# Patient Record
Sex: FEMALE | Race: WHITE | NOT HISPANIC OR LATINO
[De-identification: names, ages, dates, MRNs, and addresses within clinical notes are randomized per-mention and may not be internally consistent; named-entity substitution may affect disease eponyms.]

---

## 2020-10-06 ENCOUNTER — TRANSCRIPTION ENCOUNTER (OUTPATIENT)
Age: 65
End: 2020-10-06

## 2020-11-10 ENCOUNTER — APPOINTMENT (OUTPATIENT)
Dept: ORTHOPEDIC SURGERY | Facility: CLINIC | Age: 65
End: 2020-11-10
Payer: MEDICARE

## 2020-11-10 VITALS
HEIGHT: 63 IN | HEART RATE: 63 BPM | SYSTOLIC BLOOD PRESSURE: 145 MMHG | DIASTOLIC BLOOD PRESSURE: 77 MMHG | BODY MASS INDEX: 28.7 KG/M2 | WEIGHT: 162 LBS

## 2020-11-10 VITALS — TEMPERATURE: 97.8 F

## 2020-11-10 PROCEDURE — 72100 X-RAY EXAM L-S SPINE 2/3 VWS: CPT

## 2020-11-10 PROCEDURE — 73502 X-RAY EXAM HIP UNI 2-3 VIEWS: CPT | Mod: LT

## 2020-11-10 PROCEDURE — 20610 DRAIN/INJ JOINT/BURSA W/O US: CPT | Mod: LT

## 2020-11-10 PROCEDURE — 99204 OFFICE O/P NEW MOD 45 MIN: CPT | Mod: 25

## 2020-11-10 NOTE — PHYSICAL EXAM
[de-identified] : Patient is well nourished, well-developed, in no acute distress, with appropriate mood and affect. The patient is oriented to time, place, and person. Respirations are even and unlabored. Gait evaluation does not reveal a limp. There is no inguinal adenopathy. Examination of the contralateral hip shows normal range of motion, strength, no tenderness, and intact skin. The affected limb is well-perfused and showed 2+ dp/pt pulses, without skin lesions, shows a grossly normal motor and sensory examination. Examination of the hip shows no skin lesions. Hip motion is full and painless from 0-90 degrees extension to flexion, 20 degrees adduction and 20 degrees abduction, and 15 degrees internal and 30 degrees external rotation. Leg lengths are approximately equal. FADIR is negative and MICHAEL is negative. Stinchfield test is negative. Both hips are stable and muscle strength is normal with good strength with resisted abduction and adduction. Pedal pulses are palpable.  Examination of the lumbar spine reveals full range of motion without pain. There is no tenderness to palpation of the osseous structures or paravertebral soft tissues. There is no muscle spasm. Straight leg raise is negative bilaterally. [de-identified] : AP and lateral x-rays of the left hip, pelvis, and femur were ordered and taken in the office and demonstrate no evidence of degenerative joint disease of the hip with maintained joint space and no evidence of fractures or other intraarticular pathology.\par \par AP lateral radius of the lumbar spine were ordered and obtained the office demonstrate severe degenerative disc disease of the lumbar spine as well as a spondylolisthesis L4-5.  No evidence of spondylolysis.

## 2020-11-10 NOTE — HISTORY OF PRESENT ILLNESS
[de-identified] : This is very nice 65-year-old female experiencing left buttock and lateral hip pain, which is severe in intensity.  The pain substantially limits activities of daily living. Walking tolerance is reduced.  No groin pain.  She does not take any medications for this.  She has not tried physical therapy.  She does not use a cane or walker.  She states that she has had chronic low back pain for several years.  She has had pain in the left hip since she fell 9 months ago.  She also has chronic pain in the right foot from a prior calcaneus fracture surgery and so she walks with a limp for the last several years.  The patient denies any radiation of the pain to the feet and it is not associated with numbness, tingling, or weakness.

## 2020-11-10 NOTE — DISCUSSION/SUMMARY
[de-identified] : This patient has severe degenerative disc disease of the lumbar spine as well as a spondylolisthesis L4-5.  She also has left hip trochanteric bursitis.  The patient is not an appropriate candidate for surgical intervention at this time. An extensive discussion was conducted on the natural history of the disease and the variety of surgical and non-surgical options available to the patient including, but not limited to non-steroidal anti-inflammatory medications, steroid injections, physical therapy, maintenance of ideal body weight, and reduction of activity.  Today we performed an injection of the left hip trochanteric bursa which relieved a significant portion of the left lateral hip pain.  Referred to physical therapy for core body strengthening and gait training.  Referred to physiatry for epidural steroid injections or selective nerve root injections given her severe degenerative disease of the lumbar spine.  The patient will schedule an appointment as needed.\par \par Informed consent for the left hip trochanteric bursa injection was obtained. All questions were answered. A time out was performed. The left lateral hip was prepped and draped in sterile fashion. Using sterile technique, the left greater trochanter was injection with 2cc of Kenalog and 4cc of 0.25% marcaine using a 21-gauge needle. A sterile dressing was applied. Post injection instructions were reviewed. The patient reported relief of symptoms after the injection. The patient tolerated the procedure well.\par

## 2020-11-23 ENCOUNTER — TRANSCRIPTION ENCOUNTER (OUTPATIENT)
Age: 65
End: 2020-11-23

## 2020-11-25 ENCOUNTER — TRANSCRIPTION ENCOUNTER (OUTPATIENT)
Age: 65
End: 2020-11-25

## 2020-12-07 ENCOUNTER — TRANSCRIPTION ENCOUNTER (OUTPATIENT)
Age: 65
End: 2020-12-07

## 2020-12-30 ENCOUNTER — TRANSCRIPTION ENCOUNTER (OUTPATIENT)
Age: 65
End: 2020-12-30

## 2021-01-23 ENCOUNTER — TRANSCRIPTION ENCOUNTER (OUTPATIENT)
Age: 66
End: 2021-01-23

## 2021-01-28 ENCOUNTER — APPOINTMENT (OUTPATIENT)
Dept: FAMILY MEDICINE | Facility: CLINIC | Age: 66
End: 2021-01-28
Payer: MEDICARE

## 2021-01-28 VITALS
HEIGHT: 63 IN | BODY MASS INDEX: 30.65 KG/M2 | TEMPERATURE: 97.6 F | SYSTOLIC BLOOD PRESSURE: 140 MMHG | DIASTOLIC BLOOD PRESSURE: 82 MMHG | HEART RATE: 70 BPM | OXYGEN SATURATION: 98 % | WEIGHT: 173 LBS

## 2021-01-28 VITALS — DIASTOLIC BLOOD PRESSURE: 80 MMHG | SYSTOLIC BLOOD PRESSURE: 130 MMHG

## 2021-01-28 DIAGNOSIS — Z13.89 ENCOUNTER FOR SCREENING FOR OTHER DISORDER: ICD-10-CM

## 2021-01-28 DIAGNOSIS — Z13.820 ENCOUNTER FOR SCREENING FOR OSTEOPOROSIS: ICD-10-CM

## 2021-01-28 DIAGNOSIS — E55.9 VITAMIN D DEFICIENCY, UNSPECIFIED: ICD-10-CM

## 2021-01-28 DIAGNOSIS — Z13.1 ENCOUNTER FOR SCREENING FOR DIABETES MELLITUS: ICD-10-CM

## 2021-01-28 DIAGNOSIS — Z13.29 ENCOUNTER FOR SCREENING FOR OTHER SUSPECTED ENDOCRINE DISORDER: ICD-10-CM

## 2021-01-28 DIAGNOSIS — Z13.220 ENCOUNTER FOR SCREENING FOR LIPOID DISORDERS: ICD-10-CM

## 2021-01-28 DIAGNOSIS — R10.9 UNSPECIFIED ABDOMINAL PAIN: ICD-10-CM

## 2021-01-28 DIAGNOSIS — R22.2 LOCALIZED SWELLING, MASS AND LUMP, TRUNK: ICD-10-CM

## 2021-01-28 PROCEDURE — G0439: CPT

## 2021-01-28 PROCEDURE — G0444 DEPRESSION SCREEN ANNUAL: CPT | Mod: 59

## 2021-01-28 RX ORDER — NADOLOL 20 MG/1
20 TABLET ORAL
Refills: 0 | Status: ACTIVE | COMMUNITY

## 2021-02-01 ENCOUNTER — TRANSCRIPTION ENCOUNTER (OUTPATIENT)
Age: 66
End: 2021-02-01

## 2021-02-01 LAB
25(OH)D3 SERPL-MCNC: 26.4 NG/ML
ALBUMIN SERPL ELPH-MCNC: 4.3 G/DL
ALP BLD-CCNC: 111 U/L
ALT SERPL-CCNC: 21 U/L
ANION GAP SERPL CALC-SCNC: 12 MMOL/L
APPEARANCE: CLEAR
AST SERPL-CCNC: 19 U/L
BACTERIA: NEGATIVE
BASOPHILS # BLD AUTO: 0.06 K/UL
BASOPHILS NFR BLD AUTO: 0.7 %
BILIRUB SERPL-MCNC: 0.3 MG/DL
BILIRUBIN URINE: NEGATIVE
BLOOD URINE: NEGATIVE
BUN SERPL-MCNC: 18 MG/DL
CALCIUM SERPL-MCNC: 9.6 MG/DL
CHLORIDE SERPL-SCNC: 105 MMOL/L
CHOLEST SERPL-MCNC: 215 MG/DL
CO2 SERPL-SCNC: 24 MMOL/L
COLOR: YELLOW
CREAT SERPL-MCNC: 0.86 MG/DL
EOSINOPHIL # BLD AUTO: 0.32 K/UL
EOSINOPHIL NFR BLD AUTO: 3.5 %
ESTIMATED AVERAGE GLUCOSE: 111 MG/DL
GLUCOSE QUALITATIVE U: NEGATIVE
GLUCOSE SERPL-MCNC: 94 MG/DL
HBA1C MFR BLD HPLC: 5.5 %
HCT VFR BLD CALC: 47.9 %
HCV AB SER QL: NONREACTIVE
HCV S/CO RATIO: 0.07 S/CO
HDLC SERPL-MCNC: 58 MG/DL
HGB BLD-MCNC: 14.7 G/DL
HYALINE CASTS: 0 /LPF
IMM GRANULOCYTES NFR BLD AUTO: 0.3 %
KETONES URINE: NEGATIVE
LDLC SERPL CALC-MCNC: 137 MG/DL
LEUKOCYTE ESTERASE URINE: NEGATIVE
LYMPHOCYTES # BLD AUTO: 2.11 K/UL
LYMPHOCYTES NFR BLD AUTO: 22.9 %
MAN DIFF?: NORMAL
MCHC RBC-ENTMCNC: 28.4 PG
MCHC RBC-ENTMCNC: 30.7 GM/DL
MCV RBC AUTO: 92.6 FL
MICROSCOPIC-UA: NORMAL
MONOCYTES # BLD AUTO: 0.87 K/UL
MONOCYTES NFR BLD AUTO: 9.5 %
NEUTROPHILS # BLD AUTO: 5.81 K/UL
NEUTROPHILS NFR BLD AUTO: 63.1 %
NITRITE URINE: NEGATIVE
NONHDLC SERPL-MCNC: 157 MG/DL
PH URINE: 5.5
PLATELET # BLD AUTO: 293 K/UL
POTASSIUM SERPL-SCNC: 4.4 MMOL/L
PROT SERPL-MCNC: 6.8 G/DL
PROTEIN URINE: NEGATIVE
RBC # BLD: 5.17 M/UL
RBC # FLD: 13.9 %
RED BLOOD CELLS URINE: 1 /HPF
SARS-COV-2 IGG SERPL IA-ACNC: <0.1 INDEX
SARS-COV-2 IGG SERPL QL IA: NEGATIVE
SODIUM SERPL-SCNC: 141 MMOL/L
SPECIFIC GRAVITY URINE: 1.02
SQUAMOUS EPITHELIAL CELLS: 1 /HPF
TRIGL SERPL-MCNC: 103 MG/DL
TSH SERPL-ACNC: 0.99 UIU/ML
URATE SERPL-MCNC: 5.1 MG/DL
UROBILINOGEN URINE: NORMAL
WBC # FLD AUTO: 9.2 K/UL
WHITE BLOOD CELLS URINE: 1 /HPF

## 2021-02-01 NOTE — PLAN
[FreeTextEntry1] : \par - Blood work today\par - DEXA script provided \par - MRI imaging ordered to have chest/neck mass evaluated

## 2021-02-01 NOTE — HISTORY OF PRESENT ILLNESS
[FreeTextEntry1] : NP-CPE [de-identified] : NELIA is a 65 year female here as a new patient establishing care. States she used to go to [facility] for her care. Does not exercise. Lives with her  and special needs daughter. Goes to [facility]. Taking Nadolol 20 mg. States she has gained a lot of weight. Pt explains she has a hx of non cancerous cerebral meningioma. States she had it removed but that back in 2012 she had repeat CT of head revealing four more angiomas. States she is being followed by neurology and neurosurgery at [facility]. Reports she contracted Covid in November with no symptoms. Follows up with GYN Dr. Anthony. Complaints of occasional mild RUQ pain. States there is a family hx of cholecystectomies and would like to have her gallbladder checked. Notes that she has always had a prominence around her upper chest wall/neck area but that her  told her it has recently increased in size. \par \par \par

## 2021-02-01 NOTE — ADDENDUM
[FreeTextEntry1] : I, Sirena Lopes acting as a scribe for Dr. Aicha Metz on Jan 28, 2021  at 10:37 AM\par

## 2021-02-01 NOTE — END OF VISIT
[FreeTextEntry3] : Medical record entries made by the scribe today today, were at my direction and personally dictated to them by me, Dr. Aicha Metz on Jan 28, 2021. I have reviewed the chart and agree that the record accurately reflects my personal performance of the history, physical exam, assessment, and plan.\par

## 2021-02-01 NOTE — HEALTH RISK ASSESSMENT
[Yes] : Yes [None] : None [With Family] : lives with family [] :  [Feels Safe at Home] : Feels safe at home [Fully functional (bathing, dressing, toileting, transferring, walking, feeding)] : Fully functional (bathing, dressing, toileting, transferring, walking, feeding) [Fully functional (using the telephone, shopping, preparing meals, housekeeping, doing laundry, using] : Fully functional and needs no help or supervision to perform IADLs (using the telephone, shopping, preparing meals, housekeeping, doing laundry, using transportation, managing medications and managing finances) [Good] : ~his/her~ current health as good [Poor] : ~his/her~ mood as  poor [No] : In the past 12 months have you used drugs other than those required for medical reasons? No [1] : 1) Little interest or pleasure doing things for several days (1) [2] : 2) Feeling down, depressed, or hopeless for more than half of the days (2) [] : No [[id number]] : 3 [BMO1Pszcl] : 12

## 2021-05-04 ENCOUNTER — TRANSCRIPTION ENCOUNTER (OUTPATIENT)
Age: 66
End: 2021-05-04

## 2021-05-11 ENCOUNTER — APPOINTMENT (OUTPATIENT)
Dept: ORTHOPEDIC SURGERY | Facility: CLINIC | Age: 66
End: 2021-05-11
Payer: MEDICARE

## 2021-05-11 DIAGNOSIS — M70.62 TROCHANTERIC BURSITIS, LEFT HIP: ICD-10-CM

## 2021-05-11 PROCEDURE — 73502 X-RAY EXAM HIP UNI 2-3 VIEWS: CPT | Mod: LT

## 2021-05-11 PROCEDURE — 20610 DRAIN/INJ JOINT/BURSA W/O US: CPT | Mod: LT

## 2021-05-11 PROCEDURE — 99214 OFFICE O/P EST MOD 30 MIN: CPT | Mod: 25

## 2021-05-11 NOTE — DISCUSSION/SUMMARY
[de-identified] : This patient has severe degenerative disc disease of the lumbar spine as well as a spondylolisthesis L4-5.  She also has left hip trochanteric bursitis.  The patient is not an appropriate candidate for surgical intervention at this time. An extensive discussion was conducted on the natural history of the disease and the variety of surgical and non-surgical options available to the patient including, but not limited to non-steroidal anti-inflammatory medications, steroid injections, physical therapy, maintenance of ideal body weight, and reduction of activity.  Today we performed an injection of the left hip trochanteric bursa which relieved a significant portion of the left lateral hip pain. Continue HEP for core body strengthening and gait training.  Returnto physiatry for epidural steroid injections or selective nerve root injections given her severe degenerative disease of the lumbar spine.  The patient will schedule an appointment as needed.\par \par Informed consent for the left hip trochanteric bursa injection was obtained. All questions were answered. A time out was performed. The left lateral hip was prepped and draped in sterile fashion. Using sterile technique, the left greater trochanter was injection with 2cc of Kenalog and 4cc of 0.25% marcaine using a 21-gauge needle. A sterile dressing was applied. Post injection instructions were reviewed. The patient reported relief of symptoms after the injection. The patient tolerated the procedure well.\par

## 2021-05-11 NOTE — PHYSICAL EXAM
[de-identified] : Patient is well nourished, well-developed, in no acute distress, with appropriate mood and affect. The patient is oriented to time, place, and person. Respirations are even and unlabored. Gait evaluation does not reveal a limp. There is no inguinal adenopathy. Examination of the contralateral hip shows normal range of motion, strength, no tenderness, and intact skin. The affected limb is well-perfused and showed 2+ dp/pt pulses, without skin lesions, shows a grossly normal motor and sensory examination. Examination of the hip shows no skin lesions. Hip motion is full and painless from 0-90 degrees extension to flexion, 20 degrees adduction and 20 degrees abduction, and 15 degrees internal and 30 degrees external rotation. Leg lengths are approximately equal. FADIR is negative and MICHAEL is negative. Stinchfield test is negative. Both hips are stable and muscle strength is normal with good strength with resisted abduction and adduction. Pedal pulses are palpable. Tender to palpation over the lateral aspect of the left hip. Examination of the lumbar spine reveals full range of motion without pain. There is no tenderness to palpation of the osseous structures or paravertebral soft tissues. There is no muscle spasm. Straight leg raise is negative bilaterally. [de-identified] : AP and lateral x-rays of the left hip, pelvis, and femur were ordered and taken in the office and demonstrate no evidence of degenerative joint disease of the hip with maintained joint space and no evidence of fractures or other intraarticular pathology.\par

## 2021-05-11 NOTE — HISTORY OF PRESENT ILLNESS
[de-identified] : This is very nice 66-year-old female experiencing left buttock and lateral hip pain, which is severe in intensity.  I have previously diagnosed her with trochanteric bursitis of the left hip. The pain substantially limits activities of daily living. Walking tolerance is reduced.  No groin pain. Prior injection helped signficiantly. PT helped. Does not take nsaids. She does not use a cane or walker.  She states that she has had chronic low back pain for several years. Has had pain >1 year. no groin pain.  She also has chronic pain in the right foot from a prior calcaneus fracture surgery and so she walks with a limp for the last several years.  The patient denies any radiation of the pain to the feet and it is not associated with numbness, tingling, or weakness.

## 2021-12-22 ENCOUNTER — APPOINTMENT (OUTPATIENT)
Dept: FAMILY MEDICINE | Facility: CLINIC | Age: 66
End: 2021-12-22
Payer: MEDICARE

## 2021-12-22 ENCOUNTER — NON-APPOINTMENT (OUTPATIENT)
Age: 66
End: 2021-12-22

## 2021-12-22 VITALS
WEIGHT: 176 LBS | HEIGHT: 63 IN | BODY MASS INDEX: 31.18 KG/M2 | HEART RATE: 69 BPM | SYSTOLIC BLOOD PRESSURE: 126 MMHG | OXYGEN SATURATION: 96 % | TEMPERATURE: 96.9 F | DIASTOLIC BLOOD PRESSURE: 84 MMHG

## 2021-12-22 VITALS — DIASTOLIC BLOOD PRESSURE: 82 MMHG | SYSTOLIC BLOOD PRESSURE: 126 MMHG

## 2021-12-22 PROCEDURE — 99214 OFFICE O/P EST MOD 30 MIN: CPT

## 2021-12-23 LAB
ALBUMIN SERPL ELPH-MCNC: 4.3 G/DL
ALP BLD-CCNC: 136 U/L
ALT SERPL-CCNC: 23 U/L
ANION GAP SERPL CALC-SCNC: 11 MMOL/L
APTT BLD: 29 SEC
AST SERPL-CCNC: 20 U/L
BASOPHILS # BLD AUTO: 0.05 K/UL
BASOPHILS NFR BLD AUTO: 0.7 %
BILIRUB SERPL-MCNC: 0.5 MG/DL
BUN SERPL-MCNC: 14 MG/DL
CALCIUM SERPL-MCNC: 9.5 MG/DL
CHLORIDE SERPL-SCNC: 106 MMOL/L
CO2 SERPL-SCNC: 25 MMOL/L
CREAT SERPL-MCNC: 0.76 MG/DL
EOSINOPHIL # BLD AUTO: 0.32 K/UL
EOSINOPHIL NFR BLD AUTO: 4.6 %
GLUCOSE SERPL-MCNC: 108 MG/DL
HCT VFR BLD CALC: 46.1 %
HGB BLD-MCNC: 14.3 G/DL
IMM GRANULOCYTES NFR BLD AUTO: 0.3 %
INR PPP: 0.95 RATIO
LYMPHOCYTES # BLD AUTO: 1.86 K/UL
LYMPHOCYTES NFR BLD AUTO: 26.5 %
MAN DIFF?: NORMAL
MCHC RBC-ENTMCNC: 28.3 PG
MCHC RBC-ENTMCNC: 31 GM/DL
MCV RBC AUTO: 91.1 FL
MONOCYTES # BLD AUTO: 0.59 K/UL
MONOCYTES NFR BLD AUTO: 8.4 %
NEUTROPHILS # BLD AUTO: 4.18 K/UL
NEUTROPHILS NFR BLD AUTO: 59.5 %
PLATELET # BLD AUTO: 266 K/UL
POTASSIUM SERPL-SCNC: 4.5 MMOL/L
PROT SERPL-MCNC: 6.4 G/DL
PT BLD: 11.2 SEC
RBC # BLD: 5.06 M/UL
RBC # FLD: 13.8 %
SODIUM SERPL-SCNC: 142 MMOL/L
WBC # FLD AUTO: 7.02 K/UL

## 2021-12-23 NOTE — ASSESSMENT
[Patient Optimized for Surgery] : Patient optimized for surgery [No Further Testing Recommended] : no further testing recommended [Modify anticoagulant treatment prior to procedure] : Modify anticoagulant treatment prior to procedure [Modify anti-platelet treatment prior to procedure] : Modify anti-platelet treatment prior to procedure [Continue medications as is] : Continue current medications [As per surgery] : as per surgery [FreeTextEntry4] : Reviewed hx with patient, no contradictions. Taking Nadolol 20 mg.

## 2021-12-23 NOTE — HISTORY OF PRESENT ILLNESS
[No Pertinent Cardiac History] : no history of aortic stenosis, atrial fibrillation, coronary artery disease, recent myocardial infarction, or implantable device/pacemaker [No Pertinent Pulmonary History] : no history of asthma, COPD, sleep apnea, or smoking [No Adverse Anesthesia Reaction] : no adverse anesthesia reaction in self or family member [(Patient denies any chest pain, claudication, dyspnea on exertion, orthopnea, palpitations or syncope)] : Patient denies any chest pain, claudication, dyspnea on exertion, orthopnea, palpitations or syncope [Chronic Anticoagulation] : no chronic anticoagulation [Chronic Kidney Disease] : no chronic kidney disease [Diabetes] : no diabetes [FreeTextEntry1] : ptosis repair cosmetic blepharoplasty  [FreeTextEntry2] : 01/04/2022 [FreeTextEntry3] : Dr. Amaral [FreeTextEntry4] : NELIA is a 66 year female here for medical clearance. Patient will be undergoing ptosis repair cosmetic blepharoplasty on 1/4/2022 with Dr. Amaral. Patient feels well today and has no acute complaints.

## 2021-12-23 NOTE — END OF VISIT
[FreeTextEntry3] : Medical record entries made by the scribe today today, were at my direction and personally dictated to them by me, Dr. Aicha Metz on Dec 22, 2021. I have reviewed the chart and agree that the record accurately reflects my personal performance of the history, physical exam, assessment, and plan.\par

## 2021-12-23 NOTE — ADDENDUM
[FreeTextEntry1] : I, Sirena Lopes acting as a scribe for Dr. Aicha Metz on Dec 22, 2021  at 10:05 AM\par

## 2022-05-26 ENCOUNTER — TRANSCRIPTION ENCOUNTER (OUTPATIENT)
Age: 67
End: 2022-05-26

## 2022-11-17 ENCOUNTER — NON-APPOINTMENT (OUTPATIENT)
Age: 67
End: 2022-11-17

## 2022-12-20 ENCOUNTER — TRANSCRIPTION ENCOUNTER (OUTPATIENT)
Age: 67
End: 2022-12-20

## 2023-01-20 ENCOUNTER — OFFICE (OUTPATIENT)
Dept: URBAN - METROPOLITAN AREA CLINIC 12 | Facility: CLINIC | Age: 68
Setting detail: OPHTHALMOLOGY
End: 2023-01-20
Payer: MEDICARE

## 2023-01-20 DIAGNOSIS — H00.14: ICD-10-CM

## 2023-01-20 PROCEDURE — 92012 INTRM OPH EXAM EST PATIENT: CPT | Performed by: OPTOMETRIST

## 2023-01-20 ASSESSMENT — REFRACTION_MANIFEST
OD_CYLINDER: -0.50
OD_SPHERE: -1.00
OS_AXIS: 85
OD_AXIS: 70
OS_SPHERE: -0.75
OS_CYLINDER: -0.75

## 2023-01-20 ASSESSMENT — KERATOMETRY
OD_K1POWER_DIOPTERS: 46.25
OD_K2POWER_DIOPTERS: 47.50
OS_AXISANGLE_DEGREES: 045
METHOD_AUTO_MANUAL: AUTO
OS_K2POWER_DIOPTERS: 47.50
OS_K1POWER_DIOPTERS: 43.75
OD_AXISANGLE_DEGREES: 090

## 2023-01-20 ASSESSMENT — REFRACTION_AUTOREFRACTION
OS_SPHERE: -1.00
OD_SPHERE: -0.50
OD_CYLINDER: -0.75
OD_AXIS: 083
OS_AXIS: 101
OS_CYLINDER: -0.50

## 2023-01-20 ASSESSMENT — VISUAL ACUITY
OD_BCVA: 20/60
OS_BCVA: 20/40

## 2023-01-20 ASSESSMENT — SPHEQUIV_DERIVED
OD_SPHEQUIV: -0.875
OS_SPHEQUIV: -1.25
OD_SPHEQUIV: -1.25
OS_SPHEQUIV: -1.125

## 2023-01-20 ASSESSMENT — REFRACTION_CURRENTRX
OD_CYLINDER: -1.25
OS_AXIS: 084
OD_OVR_VA: 20/
OS_SPHERE: -0.50
OS_CYLINDER: -1.25
OS_OVR_VA: 20/
OD_AXIS: 073
OS_VPRISM_DIRECTION: SV
OD_VPRISM_DIRECTION: SV
OD_SPHERE: -1.00

## 2023-01-20 ASSESSMENT — CONFRONTATIONAL VISUAL FIELD TEST (CVF)
OD_FINDINGS: FULL
OS_FINDINGS: FULL

## 2023-01-20 ASSESSMENT — LID POSITION - PTOSIS
OD_PTOSIS: ABSENT
OS_PTOSIS: ABSENT

## 2023-01-20 ASSESSMENT — TEAR BREAK UP TIME (TBUT)
OD_TBUT: 1+
OS_TBUT: 1+

## 2023-01-20 ASSESSMENT — AXIALLENGTH_DERIVED
OD_AL: 22.7261
OD_AL: 22.8625
OS_AL: 23.302
OS_AL: 23.2546

## 2023-01-20 ASSESSMENT — TONOMETRY
OD_IOP_MMHG: 18
OS_IOP_MMHG: 15

## 2023-01-20 ASSESSMENT — LID POSITION - COMMENTS
OD_COMMENTS: INCISION INTACT
OS_COMMENTS: INCISION INTACT

## 2023-01-26 ENCOUNTER — OFFICE (OUTPATIENT)
Dept: URBAN - METROPOLITAN AREA CLINIC 100 | Facility: CLINIC | Age: 68
Setting detail: OPHTHALMOLOGY
End: 2023-01-26
Payer: MEDICARE

## 2023-01-26 DIAGNOSIS — H02.403: ICD-10-CM

## 2023-01-26 DIAGNOSIS — H00.14: ICD-10-CM

## 2023-01-26 DIAGNOSIS — H04.123: ICD-10-CM

## 2023-01-26 PROCEDURE — 92012 INTRM OPH EXAM EST PATIENT: CPT | Performed by: OPHTHALMOLOGY

## 2023-01-26 PROCEDURE — 92285 EXTERNAL OCULAR PHOTOGRAPHY: CPT | Performed by: OPHTHALMOLOGY

## 2023-01-26 ASSESSMENT — CONFRONTATIONAL VISUAL FIELD TEST (CVF)
OS_FINDINGS: FULL
OD_FINDINGS: FULL

## 2023-01-26 ASSESSMENT — SPHEQUIV_DERIVED
OS_SPHEQUIV: -1.25
OD_SPHEQUIV: -0.875

## 2023-01-26 ASSESSMENT — KERATOMETRY
OS_AXISANGLE_DEGREES: 045
OD_AXISANGLE_DEGREES: 090
OD_K1POWER_DIOPTERS: 46.25
METHOD_AUTO_MANUAL: AUTO
OS_K1POWER_DIOPTERS: 43.75
OD_K2POWER_DIOPTERS: 47.50
OS_K2POWER_DIOPTERS: 47.50

## 2023-01-26 ASSESSMENT — LID POSITION - COMMENTS
OD_COMMENTS: INCISION INTACT
OS_COMMENTS: INCISION INTACT

## 2023-01-26 ASSESSMENT — REFRACTION_AUTOREFRACTION
OD_AXIS: 083
OS_AXIS: 101
OS_SPHERE: -1.00
OD_SPHERE: -0.50
OD_CYLINDER: -0.75
OS_CYLINDER: -0.50

## 2023-01-26 ASSESSMENT — TEAR BREAK UP TIME (TBUT)
OS_TBUT: 1+
OD_TBUT: 1+

## 2023-01-26 ASSESSMENT — VISUAL ACUITY
OS_BCVA: 20/40
OD_BCVA: 20/60

## 2023-01-26 ASSESSMENT — LID POSITION - PTOSIS
OS_PTOSIS: ABSENT
OD_PTOSIS: ABSENT

## 2023-01-26 ASSESSMENT — AXIALLENGTH_DERIVED
OD_AL: 22.7261
OS_AL: 23.302

## 2023-02-09 ENCOUNTER — OFFICE (OUTPATIENT)
Dept: URBAN - METROPOLITAN AREA CLINIC 100 | Facility: CLINIC | Age: 68
Setting detail: OPHTHALMOLOGY
End: 2023-02-09

## 2023-02-09 ENCOUNTER — RX ONLY (RX ONLY)
Age: 68
End: 2023-02-09

## 2023-02-09 PROBLEM — H00.14 CHALAZION; LEFT UPPER LID: Status: ACTIVE | Noted: 2023-01-20

## 2023-02-09 PROCEDURE — RESTYLANE RESTYLANE: Performed by: OPHTHALMOLOGY

## 2023-02-09 ASSESSMENT — KERATOMETRY
METHOD_AUTO_MANUAL: AUTO
OS_K1POWER_DIOPTERS: 43.75
OS_AXISANGLE_DEGREES: 045
OD_AXISANGLE_DEGREES: 090
OD_K2POWER_DIOPTERS: 47.50
OS_K2POWER_DIOPTERS: 47.50
OD_K1POWER_DIOPTERS: 46.25

## 2023-02-09 ASSESSMENT — SPHEQUIV_DERIVED
OS_SPHEQUIV: -1.25
OD_SPHEQUIV: -0.875

## 2023-02-09 ASSESSMENT — REFRACTION_AUTOREFRACTION
OS_CYLINDER: -0.50
OD_SPHERE: -0.50
OD_CYLINDER: -0.75
OD_AXIS: 083
OS_AXIS: 101
OS_SPHERE: -1.00

## 2023-02-09 ASSESSMENT — LID POSITION - COMMENTS
OD_COMMENTS: INCISION INTACT
OS_COMMENTS: INCISION INTACT

## 2023-02-09 ASSESSMENT — VISUAL ACUITY
OD_BCVA: 20/60
OS_BCVA: 20/40

## 2023-02-09 ASSESSMENT — AXIALLENGTH_DERIVED
OS_AL: 23.302
OD_AL: 22.7261

## 2023-02-09 ASSESSMENT — LID POSITION - PTOSIS
OS_PTOSIS: ABSENT
OD_PTOSIS: ABSENT

## 2023-02-09 ASSESSMENT — TEAR BREAK UP TIME (TBUT)
OD_TBUT: 1+
OS_TBUT: 1+

## 2023-02-09 ASSESSMENT — CONFRONTATIONAL VISUAL FIELD TEST (CVF)
OD_FINDINGS: FULL
OS_FINDINGS: FULL

## 2023-03-01 ENCOUNTER — APPOINTMENT (OUTPATIENT)
Dept: FAMILY MEDICINE | Facility: CLINIC | Age: 68
End: 2023-03-01
Payer: MEDICARE

## 2023-03-01 ENCOUNTER — APPOINTMENT (OUTPATIENT)
Age: 68
End: 2023-03-01

## 2023-03-01 VITALS
DIASTOLIC BLOOD PRESSURE: 82 MMHG | HEART RATE: 72 BPM | SYSTOLIC BLOOD PRESSURE: 128 MMHG | OXYGEN SATURATION: 98 % | WEIGHT: 171 LBS | HEIGHT: 63 IN | TEMPERATURE: 98.2 F | BODY MASS INDEX: 30.3 KG/M2

## 2023-03-01 DIAGNOSIS — B00.1 HERPESVIRAL VESICULAR DERMATITIS: ICD-10-CM

## 2023-03-01 DIAGNOSIS — N39.0 URINARY TRACT INFECTION, SITE NOT SPECIFIED: ICD-10-CM

## 2023-03-01 DIAGNOSIS — Z00.00 ENCOUNTER FOR GENERAL ADULT MEDICAL EXAMINATION W/OUT ABNORMAL FINDINGS: ICD-10-CM

## 2023-03-01 DIAGNOSIS — H02.403 UNSPECIFIED PTOSIS OF BILATERAL EYELIDS: ICD-10-CM

## 2023-03-01 DIAGNOSIS — M48.061 SPINAL STENOSIS, LUMBAR REGION WITHOUT NEUROGENIC CLAUDICATION: ICD-10-CM

## 2023-03-01 DIAGNOSIS — N81.10 CYSTOCELE, UNSPECIFIED: ICD-10-CM

## 2023-03-01 PROCEDURE — G0444 DEPRESSION SCREEN ANNUAL: CPT

## 2023-03-01 PROCEDURE — G0439: CPT

## 2023-03-01 NOTE — HEALTH RISK ASSESSMENT
[Patient reported mammogram was normal] : Patient reported mammogram was normal [Patient reported colonoscopy was normal] : Patient reported colonoscopy was normal [Good] : ~his/her~  mood as  good [Yes] : Yes [Monthly or less (1 pt)] : Monthly or less (1 point) [1 or 2 (0 pts)] : 1 or 2 (0 points) [Never (0 pts)] : Never (0 points) [No] : In the past 12 months have you used drugs other than those required for medical reasons? No [No falls in past year] : Patient reported no falls in the past year [0] : 1) Little interest or pleasure doing things: Not at all (0) [1] : 2) Feeling down, depressed, or hopeless for several days (1) [PHQ-2 Negative - No further assessment needed] : PHQ-2 Negative - No further assessment needed [None] : None [Feels Safe at Home] : Feels safe at home [Fully functional (bathing, dressing, toileting, transferring, walking, feeding)] : Fully functional (bathing, dressing, toileting, transferring, walking, feeding) [Fully functional (using the telephone, shopping, preparing meals, housekeeping, doing laundry, using] : Fully functional and needs no help or supervision to perform IADLs (using the telephone, shopping, preparing meals, housekeeping, doing laundry, using transportation, managing medications and managing finances) [Audit-CScore] : 1 [FWE5Eymsc] : 1 [MammogramDate] : 06/22 [MammogramComments] : has apt. 02/2023 [BoneDensityDate] : 01/23 [BoneDensityComments] : Osteopenia  [ColonoscopyDate] : 11/19 [ColonoscopyComments] : repeat 10 years

## 2023-03-01 NOTE — PLAN
[FreeTextEntry1] : Cold sore\par - Start valacyclovir HCl 1 gm\par \par - Labs drawn in office today. \par - Referral for urogynecologist provided.\par - Referral for dermatologist provided.\par - See cardiologist as planned. Continue Nadolol 20 mg.\par - Patient declined pneumonia vaccine today. \par - Discussed shingles vaccine.\par - Discussed importance of regular exercise and balanced diet.\par - Follow up for annual visit, sooner if needed.

## 2023-03-01 NOTE — HISTORY OF PRESENT ILLNESS
[FreeTextEntry1] : AWV.  Pt c/o cold sore on lips. Pt. requesting weight loss medication  [de-identified] : NELIA is a 68 year female here today for AWV. Mood is good. She is doing the deuce ricardo diet, but admits she is not exercising regularly. Patient inquired about weight loss medication. She was recently diagnosed with UTI and was given antibiotics from urgent care. She reports it initially began as right sided abdominal pain. She started antibiotics 6 days ago and states she is feeling better. She also noticed fever blisters on her lips yesterday. She recently got lip injections. She was advised to get a hysterectomy due to rectal and bladder prolapse and would like another opinion. She sees [facility] specialist for scoliosis and lower back stenosis. She saw her cardiologist in April and plans to make another appt. Patient takes nadolol 20 mg. She saw her gynecologist recently. Her last colonoscopy was in 2019 and was told to repeat in 10 years. She had a mammogram in June and DEXA several weeks ago which revealed osteopenia. She does not see dermatology.

## 2023-03-01 NOTE — END OF VISIT
[FreeTextEntry3] : Medical record entries made by the scribe today 03/01/2023, were at my direction and personally dictated to them by me, Dr. Aicha Metz on 03/01/2023. I have reviewed the chart and agree that the record accurately reflects my personal performance of the history, physical exam, assessment, and plan.

## 2023-03-02 ENCOUNTER — APPOINTMENT (OUTPATIENT)
Age: 68
End: 2023-03-02

## 2023-03-02 LAB
25(OH)D3 SERPL-MCNC: 31.4 NG/ML
ALBUMIN SERPL ELPH-MCNC: 4.5 G/DL
ALP BLD-CCNC: 137 U/L
ALT SERPL-CCNC: 27 U/L
ANION GAP SERPL CALC-SCNC: 16 MMOL/L
APPEARANCE: CLEAR
AST SERPL-CCNC: 21 U/L
BACTERIA: NEGATIVE
BASOPHILS # BLD AUTO: 0.06 K/UL
BASOPHILS NFR BLD AUTO: 0.8 %
BILIRUB SERPL-MCNC: 0.6 MG/DL
BILIRUBIN URINE: NEGATIVE
BLOOD URINE: NEGATIVE
BUN SERPL-MCNC: 19 MG/DL
CALCIUM SERPL-MCNC: 9.8 MG/DL
CHLORIDE SERPL-SCNC: 102 MMOL/L
CHOLEST SERPL-MCNC: 220 MG/DL
CO2 SERPL-SCNC: 22 MMOL/L
COLOR: YELLOW
CREAT SERPL-MCNC: 0.74 MG/DL
EGFR: 89 ML/MIN/1.73M2
EOSINOPHIL # BLD AUTO: 0.19 K/UL
EOSINOPHIL NFR BLD AUTO: 2.6 %
ESTIMATED AVERAGE GLUCOSE: 114 MG/DL
GLUCOSE QUALITATIVE U: NEGATIVE
GLUCOSE SERPL-MCNC: 80 MG/DL
HBA1C MFR BLD HPLC: 5.6 %
HCT VFR BLD CALC: 49.6 %
HDLC SERPL-MCNC: 54 MG/DL
HGB BLD-MCNC: 15.2 G/DL
HYALINE CASTS: 2 /LPF
IMM GRANULOCYTES NFR BLD AUTO: 0.4 %
KETONES URINE: NORMAL
LDLC SERPL CALC-MCNC: 149 MG/DL
LEUKOCYTE ESTERASE URINE: NEGATIVE
LYMPHOCYTES # BLD AUTO: 1.94 K/UL
LYMPHOCYTES NFR BLD AUTO: 27 %
MAN DIFF?: NORMAL
MCHC RBC-ENTMCNC: 27.1 PG
MCHC RBC-ENTMCNC: 30.6 GM/DL
MCV RBC AUTO: 88.4 FL
MICROSCOPIC-UA: NORMAL
MONOCYTES # BLD AUTO: 0.65 K/UL
MONOCYTES NFR BLD AUTO: 9 %
NEUTROPHILS # BLD AUTO: 4.32 K/UL
NEUTROPHILS NFR BLD AUTO: 60.2 %
NITRITE URINE: NEGATIVE
NONHDLC SERPL-MCNC: 166 MG/DL
PH URINE: 5.5
PLATELET # BLD AUTO: 304 K/UL
POTASSIUM SERPL-SCNC: 4.3 MMOL/L
PROT SERPL-MCNC: 7.1 G/DL
PROTEIN URINE: NORMAL
RBC # BLD: 5.61 M/UL
RBC # FLD: 12.9 %
RED BLOOD CELLS URINE: 1 /HPF
SODIUM SERPL-SCNC: 141 MMOL/L
SPECIFIC GRAVITY URINE: 1.02
SQUAMOUS EPITHELIAL CELLS: 2 /HPF
TRIGL SERPL-MCNC: 83 MG/DL
TSH SERPL-ACNC: 1.36 UIU/ML
URATE SERPL-MCNC: 5.8 MG/DL
UROBILINOGEN URINE: NORMAL
WBC # FLD AUTO: 7.19 K/UL
WHITE BLOOD CELLS URINE: 1 /HPF

## 2023-04-12 ENCOUNTER — APPOINTMENT (OUTPATIENT)
Dept: COLORECTAL SURGERY | Facility: CLINIC | Age: 68
End: 2023-04-12
Payer: MEDICARE

## 2023-04-12 VITALS
BODY MASS INDEX: 29.23 KG/M2 | HEIGHT: 63 IN | HEART RATE: 66 BPM | RESPIRATION RATE: 14 BRPM | WEIGHT: 165 LBS | SYSTOLIC BLOOD PRESSURE: 130 MMHG | DIASTOLIC BLOOD PRESSURE: 86 MMHG

## 2023-04-12 PROCEDURE — 99203 OFFICE O/P NEW LOW 30 MIN: CPT | Mod: 25

## 2023-04-12 PROCEDURE — 46600 DIAGNOSTIC ANOSCOPY SPX: CPT

## 2023-04-14 NOTE — CONSULT LETTER
[Dear  ___] : Dear  [unfilled], [Consult Letter:] : I had the pleasure of evaluating your patient, [unfilled]. [Please see my note below.] : Please see my note below. [Consult Closing:] : Thank you very much for allowing me to participate in the care of this patient.  If you have any questions, please do not hesitate to contact me. [Sincerely,] : Sincerely, [FreeTextEntry3] : Tavares Basurto MD\par

## 2023-04-14 NOTE — HISTORY OF PRESENT ILLNESS
[FreeTextEntry1] : 68-year-old female presents for consultation for pelvic floor organ prolapse.  She reports a longstanding history of bulging in the perineal area.  She could reports the sensation of still feeling stuck with defecation.  She often has to splint posteriorly in order to evacuate her stools.  She denies any rectal bleeding.  She does have nocturia but denies urinary incontinence.  No fecal incontinence either.  Colonoscopy is up-to-date

## 2023-04-14 NOTE — PHYSICAL EXAM
[Normal] : was normal [None] : there was no rectal mass  [Respiratory Effort] : normal respiratory effort [Normal Rate and Rhythm] : normal rate and rhythm [Calm] : calm [Excoriation] : no perianal excoriation [Gross Blood] : no gross blood [de-identified] : Soft, nontender, nondistended.  No mass or hernias appreciated. [de-identified] : Noted [de-identified] : Normocephalic, atraumatic [de-identified] : Well-appearing, in no distress [de-identified] : Moves extremities without difficulty [de-identified] : Warm and dry [de-identified] : Alert and oriented x3

## 2023-04-16 ENCOUNTER — NON-APPOINTMENT (OUTPATIENT)
Age: 68
End: 2023-04-16

## 2023-04-27 ENCOUNTER — RX RENEWAL (OUTPATIENT)
Age: 68
End: 2023-04-27

## 2023-05-08 ENCOUNTER — RESULT CHARGE (OUTPATIENT)
Age: 68
End: 2023-05-08

## 2023-05-08 ENCOUNTER — APPOINTMENT (OUTPATIENT)
Age: 68
End: 2023-05-08
Payer: MEDICARE

## 2023-05-08 VITALS
WEIGHT: 170 LBS | DIASTOLIC BLOOD PRESSURE: 76 MMHG | HEIGHT: 63 IN | BODY MASS INDEX: 30.12 KG/M2 | SYSTOLIC BLOOD PRESSURE: 114 MMHG

## 2023-05-08 DIAGNOSIS — R35.0 FREQUENCY OF MICTURITION: ICD-10-CM

## 2023-05-08 DIAGNOSIS — R39.15 URGENCY OF URINATION: ICD-10-CM

## 2023-05-08 DIAGNOSIS — R32 UNSPECIFIED URINARY INCONTINENCE: ICD-10-CM

## 2023-05-08 DIAGNOSIS — R35.1 NOCTURIA: ICD-10-CM

## 2023-05-08 DIAGNOSIS — K59.00 CONSTIPATION, UNSPECIFIED: ICD-10-CM

## 2023-05-08 DIAGNOSIS — F32.A DEPRESSION, UNSPECIFIED: ICD-10-CM

## 2023-05-08 DIAGNOSIS — N94.10 UNSPECIFIED DYSPAREUNIA: ICD-10-CM

## 2023-05-08 DIAGNOSIS — N81.9 FEMALE GENITAL PROLAPSE, UNSPECIFIED: ICD-10-CM

## 2023-05-08 DIAGNOSIS — M53.9 DORSOPATHY, UNSPECIFIED: ICD-10-CM

## 2023-05-08 LAB
BILIRUB UR QL STRIP: NEGATIVE
CLARITY UR: CLEAR
COLLECTION METHOD: NORMAL
GLUCOSE UR-MCNC: NEGATIVE
HCG UR QL: 0.2 EU/DL
HGB UR QL STRIP.AUTO: NEGATIVE
KETONES UR-MCNC: NEGATIVE
LEUKOCYTE ESTERASE UR QL STRIP: NORMAL
NITRITE UR QL STRIP: NEGATIVE
PH UR STRIP: 5.5
PROT UR STRIP-MCNC: NEGATIVE
SP GR UR STRIP: 1.03

## 2023-05-08 PROCEDURE — 99204 OFFICE O/P NEW MOD 45 MIN: CPT | Mod: 25

## 2023-05-08 PROCEDURE — 81003 URINALYSIS AUTO W/O SCOPE: CPT | Mod: QW

## 2023-05-08 PROCEDURE — 51701 INSERT BLADDER CATHETER: CPT | Mod: 59

## 2023-05-08 NOTE — PHYSICAL EXAM
[Chaperone Present] : A chaperone was present in the examining room during all aspects of the physical examination [No Acute Distress] : in no acute distress [Well developed] : well developed [Well Nourished] : ~L well nourished [Oriented x3] : oriented to person, place, and time [Normal Memory] : ~T memory was ~L unimpaired [Normal Mood/Affect] : mood and affect are normal [Cough] : no cough [No Edema] : ~T edema was not present [Tenderness] : ~T no ~M abdominal tenderness observed [Distended] : not distended [None] : no CVA tenderness [Inguinal LAD] : no adenopathy was noted in the inguinal lymph nodes [Warm and Dry] : was warm and dry to touch [Normal Gait] : gait was normal [Vulvar Atrophy] : vulvar atrophy [Labia Majora] : were normal [Labia Minora] : were normal [Normal Appearance] : general appearance was normal [No Bleeding] : there was no active vaginal bleeding [2] : 2 [Aa ____] : Aa [unfilled] [Ba ____] : Ba [unfilled] [C ____] : C [unfilled] [GH ____] : GH [unfilled] [PB ____] : PB [unfilled] [TVL ____] : TVL  [unfilled] [Ap ____] : Ap [unfilled] [Bp ____] : Bp [unfilled] [D ____] : D [unfilled] [Soft] :  the cervix was soft [Uterine Adnexae] : were not tender and not enlarged [Normal] : no abnormalities [Post Void Residual ____ml] : post void residual was [unfilled] ml [FreeTextEntry3] : neg CST

## 2023-05-08 NOTE — HISTORY OF PRESENT ILLNESS
[Uterine Prolapse] : no [Vaginal Wall Prolapse] : moderate [Rectal Prolapse] : no [Unable To Restrain Bowel Movement] : mild [x2] : nocturia two times a night [Feelings Of Urinary Urgency] : no [Pain During Urination (Dysuria)] : no [Urinary Tract Infection] : moderate [Constipation Obstructed Defecation] : mild [Diarrhea] : no [Pelvic Pain] : no [Vaginal Pain] : no [Vulvar Pain] : no [] : no [FreeTextEntry1] : \par  69 y/o presents with vaginal bulge, rectocele as per CRS notes which were reviewed, no BERNARD, occ UUI, reports frequency, nocturia 2-3 times, denies hematuria, denies incomplete emptying, no UTIs, no intermittent stream, no pelvic pain, reports many years of bulge, went once to pelvic floor PT, reports she has difficulty with BM, incomplete emptying, sexually active, occ pain, reports constipation, reports colonoscopy 6 years ago, normal

## 2023-05-08 NOTE — PROCEDURE
[Straight Catheterization] : insertion of a straight catheter [Urinary Frequency] : urinary frequency [Patient] : the patient [___ Fr Straight Tip] : a [unfilled] in Russian straight tip catheter [None] : there were no complications with the catheter insertion [Clear] : clear [Culture] : culture [No Complications] : no complications [Tolerated Well] : the patient tolerated the procedure well [Post procedure instructions and information given] : Post procedure instructions and information were given and reviewed with patient. [0] : 0

## 2023-05-08 NOTE — OB HISTORY
[Vaginal ___] : [unfilled] vaginal delivery(s) [unknown] : the patient is unsure of the date of her LMP [Last Pap Smear ___] : date of last pap smear was on [unfilled] [Sexually Active] : sexually active [Regular Cycle Intervals] : periods have been irregular

## 2023-05-08 NOTE — DISCUSSION/SUMMARY
[FreeTextEntry1] : \par Loreto presents for eval of rectocele, no uterine prolapse, negative CST, normal PVR.\par \par 1. Rectocele: discussed option for rectocele management, observation/PFE/pessary and posterior repair, wants posterior repair. No BERNARD and would not want sling. All questions answered\par \par IUGA POP/PFE given,\par \par [] post repair

## 2023-05-09 LAB
APPEARANCE: CLEAR
BACTERIA: NEGATIVE /HPF
BILIRUBIN URINE: NEGATIVE
BLOOD URINE: NEGATIVE
CAST: 1 /LPF
COLOR: YELLOW
EPITHELIAL CELLS: 4 /HPF
GLUCOSE QUALITATIVE U: NEGATIVE MG/DL
KETONES URINE: NEGATIVE MG/DL
LEUKOCYTE ESTERASE URINE: ABNORMAL
MICROSCOPIC-UA: NORMAL
NITRITE URINE: NEGATIVE
PH URINE: 5
PROTEIN URINE: NEGATIVE MG/DL
RED BLOOD CELLS URINE: 1 /HPF
SPECIFIC GRAVITY URINE: 1.02
UROBILINOGEN URINE: 0.2 MG/DL
WHITE BLOOD CELLS URINE: 3 /HPF

## 2023-05-10 LAB — BACTERIA UR CULT: NORMAL

## 2023-08-25 ENCOUNTER — APPOINTMENT (OUTPATIENT)
Dept: FAMILY MEDICINE | Facility: CLINIC | Age: 68
End: 2023-08-25
Payer: MEDICARE

## 2023-08-25 VITALS
SYSTOLIC BLOOD PRESSURE: 110 MMHG | DIASTOLIC BLOOD PRESSURE: 76 MMHG | HEIGHT: 63 IN | OXYGEN SATURATION: 98 % | HEART RATE: 68 BPM | WEIGHT: 152 LBS | BODY MASS INDEX: 26.93 KG/M2

## 2023-08-25 DIAGNOSIS — Z11.1 ENCOUNTER FOR SCREENING FOR RESPIRATORY TUBERCULOSIS: ICD-10-CM

## 2023-08-25 PROCEDURE — 99213 OFFICE O/P EST LOW 20 MIN: CPT

## 2023-08-25 PROCEDURE — 86580 TB INTRADERMAL TEST: CPT

## 2023-08-28 ENCOUNTER — APPOINTMENT (OUTPATIENT)
Age: 68
End: 2023-08-28

## 2023-08-28 ENCOUNTER — APPOINTMENT (OUTPATIENT)
Dept: FAMILY MEDICINE | Facility: CLINIC | Age: 68
End: 2023-08-28
Payer: MEDICARE

## 2023-08-28 LAB
ALBUMIN SERPL ELPH-MCNC: 4.4 G/DL
ALP BLD-CCNC: 96 U/L
ALT SERPL-CCNC: 21 U/L
ANION GAP SERPL CALC-SCNC: 13 MMOL/L
AST SERPL-CCNC: 20 U/L
BILIRUB SERPL-MCNC: 0.5 MG/DL
BUN SERPL-MCNC: 16 MG/DL
CALCIUM SERPL-MCNC: 10 MG/DL
CHLORIDE SERPL-SCNC: 105 MMOL/L
CHOLEST SERPL-MCNC: 202 MG/DL
CO2 SERPL-SCNC: 23 MMOL/L
CREAT SERPL-MCNC: 0.8 MG/DL
EGFR: 81 ML/MIN/1.73M2
ESTIMATED AVERAGE GLUCOSE: 103 MG/DL
GLUCOSE SERPL-MCNC: 88 MG/DL
HBA1C MFR BLD HPLC: 5.2 %
HBV SURFACE AB SER QL: NONREACTIVE
HDLC SERPL-MCNC: 52 MG/DL
LDLC SERPL CALC-MCNC: 137 MG/DL
MEV IGG FLD QL IA: >300 AU/ML
MEV IGG+IGM SER-IMP: POSITIVE
MUV AB SER-ACNC: POSITIVE
MUV IGG SER QL IA: >300 AU/ML
NONHDLC SERPL-MCNC: 150 MG/DL
POTASSIUM SERPL-SCNC: 5 MMOL/L
PROT SERPL-MCNC: 6.8 G/DL
RUBV IGG FLD-ACNC: 32.3 INDEX
RUBV IGG SER-IMP: POSITIVE
SODIUM SERPL-SCNC: 140 MMOL/L
TRIGL SERPL-MCNC: 76 MG/DL
VZV AB TITR SER: POSITIVE
VZV IGG SER IF-ACNC: 2055 INDEX

## 2023-08-28 PROCEDURE — ZZZZZ: CPT

## 2023-09-21 ENCOUNTER — OFFICE (OUTPATIENT)
Dept: URBAN - METROPOLITAN AREA CLINIC 12 | Facility: CLINIC | Age: 68
Setting detail: OPHTHALMOLOGY
End: 2023-09-21
Payer: MEDICARE

## 2023-09-21 DIAGNOSIS — H35.373: ICD-10-CM

## 2023-09-21 DIAGNOSIS — C71.1: ICD-10-CM

## 2023-09-21 DIAGNOSIS — H16.223: ICD-10-CM

## 2023-09-21 DIAGNOSIS — H43.393: ICD-10-CM

## 2023-09-21 DIAGNOSIS — H25.13: ICD-10-CM

## 2023-09-21 DIAGNOSIS — H35.3131: ICD-10-CM

## 2023-09-21 PROCEDURE — 92014 COMPRE OPH EXAM EST PT 1/>: CPT | Performed by: OPHTHALMOLOGY

## 2023-09-21 PROCEDURE — 92250 FUNDUS PHOTOGRAPHY W/I&R: CPT | Performed by: OPHTHALMOLOGY

## 2023-09-21 ASSESSMENT — SPHEQUIV_DERIVED
OD_SPHEQUIV: -0.625
OS_SPHEQUIV: -1.125
OS_SPHEQUIV: -1.125
OD_SPHEQUIV: -0.625

## 2023-09-21 ASSESSMENT — TONOMETRY
OD_IOP_MMHG: 16
OS_IOP_MMHG: 16

## 2023-09-21 ASSESSMENT — REFRACTION_CURRENTRX
OS_SPHERE: -0.50
OD_OVR_VA: 20/
OD_SPHERE: -1.00
OS_CYLINDER: -1.00
OS_AXIS: 096
OS_VPRISM_DIRECTION: SV
OD_VPRISM_DIRECTION: SV
OD_AXIS: 069
OS_OVR_VA: 20/
OD_CYLINDER: -1.00

## 2023-09-21 ASSESSMENT — CONFRONTATIONAL VISUAL FIELD TEST (CVF)
OS_FINDINGS: FULL
OD_FINDINGS: FULL

## 2023-09-21 ASSESSMENT — KERATOMETRY
METHOD_AUTO_MANUAL: AUTO
OS_K2POWER_DIOPTERS: 47.00
OD_K2POWER_DIOPTERS: 47.00
OD_AXISANGLE_DEGREES: 111
OS_K1POWER_DIOPTERS: 47.00
OD_K1POWER_DIOPTERS: 46.50
OS_AXISANGLE_DEGREES: 090

## 2023-09-21 ASSESSMENT — AXIALLENGTH_DERIVED
OS_AL: 22.774
OS_AL: 22.774
OD_AL: 22.6784
OD_AL: 22.6784

## 2023-09-21 ASSESSMENT — REFRACTION_MANIFEST
OS_AXIS: 095
OS_VA1: 20/40
OD_VA1: 20/20-2
OD_AXIS: 075
OS_SPHERE: -0.50
OD_SPHERE: -0.25
OD_CYLINDER: -0.75
OS_CYLINDER: -1.25

## 2023-09-21 ASSESSMENT — REFRACTION_AUTOREFRACTION
OS_AXIS: 095
OD_AXIS: 075
OS_SPHERE: -0.50
OD_CYLINDER: -0.75
OD_SPHERE: -0.25
OS_CYLINDER: -1.25

## 2023-09-21 ASSESSMENT — LID POSITION - PTOSIS
OS_PTOSIS: ABSENT
OD_PTOSIS: ABSENT

## 2023-09-21 ASSESSMENT — VISUAL ACUITY
OD_BCVA: 20/30-2
OS_BCVA: 20/50+2

## 2023-09-21 ASSESSMENT — TEAR BREAK UP TIME (TBUT)
OS_TBUT: 1+
OD_TBUT: 1+

## 2023-09-21 ASSESSMENT — LID POSITION - COMMENTS
OD_COMMENTS: INCISION INTACT
OS_COMMENTS: INCISION INTACT

## 2023-09-28 ENCOUNTER — APPOINTMENT (OUTPATIENT)
Dept: UROGYNECOLOGY | Facility: CLINIC | Age: 68
End: 2023-09-28
Payer: MEDICARE

## 2023-09-28 PROCEDURE — 99214 OFFICE O/P EST MOD 30 MIN: CPT

## 2023-10-03 ENCOUNTER — NON-APPOINTMENT (OUTPATIENT)
Age: 68
End: 2023-10-03

## 2023-10-04 ENCOUNTER — APPOINTMENT (OUTPATIENT)
Dept: ULTRASOUND IMAGING | Facility: CLINIC | Age: 68
End: 2023-10-04
Payer: MEDICARE

## 2023-10-04 ENCOUNTER — NON-APPOINTMENT (OUTPATIENT)
Age: 68
End: 2023-10-04

## 2023-10-04 ENCOUNTER — OUTPATIENT (OUTPATIENT)
Dept: OUTPATIENT SERVICES | Facility: HOSPITAL | Age: 68
LOS: 1 days | End: 2023-10-04
Payer: MEDICARE

## 2023-10-04 DIAGNOSIS — R35.0 FREQUENCY OF MICTURITION: ICD-10-CM

## 2023-10-04 DIAGNOSIS — N81.9 FEMALE GENITAL PROLAPSE, UNSPECIFIED: ICD-10-CM

## 2023-10-04 PROCEDURE — 76830 TRANSVAGINAL US NON-OB: CPT | Mod: 26

## 2023-10-04 PROCEDURE — 76830 TRANSVAGINAL US NON-OB: CPT

## 2023-10-04 PROCEDURE — 76856 US EXAM PELVIC COMPLETE: CPT

## 2023-10-04 PROCEDURE — 76856 US EXAM PELVIC COMPLETE: CPT | Mod: 26

## 2023-10-09 ENCOUNTER — OUTPATIENT (OUTPATIENT)
Dept: OUTPATIENT SERVICES | Facility: HOSPITAL | Age: 68
LOS: 1 days | End: 2023-10-09
Payer: MEDICARE

## 2023-10-09 VITALS
WEIGHT: 143.96 LBS | DIASTOLIC BLOOD PRESSURE: 81 MMHG | TEMPERATURE: 98 F | OXYGEN SATURATION: 100 % | HEART RATE: 64 BPM | RESPIRATION RATE: 18 BRPM | SYSTOLIC BLOOD PRESSURE: 113 MMHG | HEIGHT: 63 IN

## 2023-10-09 DIAGNOSIS — Z92.3 PERSONAL HISTORY OF IRRADIATION: Chronic | ICD-10-CM

## 2023-10-09 DIAGNOSIS — Z01.818 ENCOUNTER FOR OTHER PREPROCEDURAL EXAMINATION: ICD-10-CM

## 2023-10-09 DIAGNOSIS — N81.6 RECTOCELE: ICD-10-CM

## 2023-10-09 LAB
A1C WITH ESTIMATED AVERAGE GLUCOSE RESULT: 5.2 % — SIGNIFICANT CHANGE UP (ref 4–5.6)
ABO RH CONFIRMATION: SIGNIFICANT CHANGE UP
ANION GAP SERPL CALC-SCNC: 1 MMOL/L — LOW (ref 5–17)
APPEARANCE UR: CLEAR — SIGNIFICANT CHANGE UP
APTT BLD: 30.1 SEC — SIGNIFICANT CHANGE UP (ref 24.5–35.6)
BACTERIA # UR AUTO: NEGATIVE /HPF — SIGNIFICANT CHANGE UP
BASOPHILS # BLD AUTO: 0.07 K/UL — SIGNIFICANT CHANGE UP (ref 0–0.2)
BASOPHILS NFR BLD AUTO: 0.9 % — SIGNIFICANT CHANGE UP (ref 0–2)
BILIRUB UR-MCNC: NEGATIVE — SIGNIFICANT CHANGE UP
BLD GP AB SCN SERPL QL: SIGNIFICANT CHANGE UP
BUN SERPL-MCNC: 17 MG/DL — SIGNIFICANT CHANGE UP (ref 7–23)
CALCIUM SERPL-MCNC: 9 MG/DL — SIGNIFICANT CHANGE UP (ref 8.5–10.1)
CHLORIDE SERPL-SCNC: 110 MMOL/L — HIGH (ref 96–108)
CO2 SERPL-SCNC: 32 MMOL/L — HIGH (ref 22–31)
COLOR SPEC: YELLOW — SIGNIFICANT CHANGE UP
CREAT SERPL-MCNC: 0.97 MG/DL — SIGNIFICANT CHANGE UP (ref 0.5–1.3)
DIFF PNL FLD: NEGATIVE — SIGNIFICANT CHANGE UP
EGFR: 64 ML/MIN/1.73M2 — SIGNIFICANT CHANGE UP
EOSINOPHIL # BLD AUTO: 0.3 K/UL — SIGNIFICANT CHANGE UP (ref 0–0.5)
EOSINOPHIL NFR BLD AUTO: 3.7 % — SIGNIFICANT CHANGE UP (ref 0–6)
ESTIMATED AVERAGE GLUCOSE: 103 MG/DL — SIGNIFICANT CHANGE UP (ref 68–114)
GLUCOSE SERPL-MCNC: 108 MG/DL — HIGH (ref 70–99)
GLUCOSE UR QL: NEGATIVE MG/DL — SIGNIFICANT CHANGE UP
HCT VFR BLD CALC: 42.8 % — SIGNIFICANT CHANGE UP (ref 34.5–45)
HGB BLD-MCNC: 14.1 G/DL — SIGNIFICANT CHANGE UP (ref 11.5–15.5)
IMM GRANULOCYTES NFR BLD AUTO: 0.1 % — SIGNIFICANT CHANGE UP (ref 0–0.9)
INR BLD: 1.01 RATIO — SIGNIFICANT CHANGE UP (ref 0.85–1.18)
KETONES UR-MCNC: NEGATIVE MG/DL — SIGNIFICANT CHANGE UP
LEUKOCYTE ESTERASE UR-ACNC: ABNORMAL
LYMPHOCYTES # BLD AUTO: 2.59 K/UL — SIGNIFICANT CHANGE UP (ref 1–3.3)
LYMPHOCYTES # BLD AUTO: 32.1 % — SIGNIFICANT CHANGE UP (ref 13–44)
MCHC RBC-ENTMCNC: 28.9 PG — SIGNIFICANT CHANGE UP (ref 27–34)
MCHC RBC-ENTMCNC: 32.9 GM/DL — SIGNIFICANT CHANGE UP (ref 32–36)
MCV RBC AUTO: 87.7 FL — SIGNIFICANT CHANGE UP (ref 80–100)
MONOCYTES # BLD AUTO: 0.71 K/UL — SIGNIFICANT CHANGE UP (ref 0–0.9)
MONOCYTES NFR BLD AUTO: 8.8 % — SIGNIFICANT CHANGE UP (ref 2–14)
NEUTROPHILS # BLD AUTO: 4.4 K/UL — SIGNIFICANT CHANGE UP (ref 1.8–7.4)
NEUTROPHILS NFR BLD AUTO: 54.4 % — SIGNIFICANT CHANGE UP (ref 43–77)
NITRITE UR-MCNC: NEGATIVE — SIGNIFICANT CHANGE UP
PH UR: 5.5 — SIGNIFICANT CHANGE UP (ref 5–8)
PLATELET # BLD AUTO: 292 K/UL — SIGNIFICANT CHANGE UP (ref 150–400)
POTASSIUM SERPL-MCNC: 4.2 MMOL/L — SIGNIFICANT CHANGE UP (ref 3.5–5.3)
POTASSIUM SERPL-SCNC: 4.2 MMOL/L — SIGNIFICANT CHANGE UP (ref 3.5–5.3)
PROT UR-MCNC: NEGATIVE MG/DL — SIGNIFICANT CHANGE UP
PROTHROM AB SERPL-ACNC: 11.4 SEC — SIGNIFICANT CHANGE UP (ref 9.5–13)
RBC # BLD: 4.88 M/UL — SIGNIFICANT CHANGE UP (ref 3.8–5.2)
RBC # FLD: 13.3 % — SIGNIFICANT CHANGE UP (ref 10.3–14.5)
RBC CASTS # UR COMP ASSIST: 0 /HPF — SIGNIFICANT CHANGE UP (ref 0–4)
SODIUM SERPL-SCNC: 143 MMOL/L — SIGNIFICANT CHANGE UP (ref 135–145)
SP GR SPEC: 1.02 — SIGNIFICANT CHANGE UP (ref 1–1.03)
SQUAMOUS # UR AUTO: 1 /HPF — SIGNIFICANT CHANGE UP (ref 0–5)
UROBILINOGEN FLD QL: 0.2 MG/DL — SIGNIFICANT CHANGE UP (ref 0.2–1)
WBC # BLD: 8.08 K/UL — SIGNIFICANT CHANGE UP (ref 3.8–10.5)
WBC # FLD AUTO: 8.08 K/UL — SIGNIFICANT CHANGE UP (ref 3.8–10.5)
WBC UR QL: 2 /HPF — SIGNIFICANT CHANGE UP (ref 0–5)

## 2023-10-09 PROCEDURE — 93010 ELECTROCARDIOGRAM REPORT: CPT

## 2023-10-09 PROCEDURE — 99214 OFFICE O/P EST MOD 30 MIN: CPT | Mod: 25

## 2023-10-09 PROCEDURE — 85610 PROTHROMBIN TIME: CPT

## 2023-10-09 PROCEDURE — 81001 URINALYSIS AUTO W/SCOPE: CPT

## 2023-10-09 PROCEDURE — 80048 BASIC METABOLIC PNL TOTAL CA: CPT

## 2023-10-09 PROCEDURE — 85025 COMPLETE CBC W/AUTO DIFF WBC: CPT

## 2023-10-09 PROCEDURE — 93005 ELECTROCARDIOGRAM TRACING: CPT

## 2023-10-09 PROCEDURE — 36415 COLL VENOUS BLD VENIPUNCTURE: CPT

## 2023-10-09 PROCEDURE — 86850 RBC ANTIBODY SCREEN: CPT

## 2023-10-09 PROCEDURE — 86900 BLOOD TYPING SEROLOGIC ABO: CPT

## 2023-10-09 PROCEDURE — 83036 HEMOGLOBIN GLYCOSYLATED A1C: CPT

## 2023-10-09 PROCEDURE — 85730 THROMBOPLASTIN TIME PARTIAL: CPT

## 2023-10-09 PROCEDURE — 86901 BLOOD TYPING SEROLOGIC RH(D): CPT

## 2023-10-09 PROCEDURE — 87086 URINE CULTURE/COLONY COUNT: CPT

## 2023-10-09 NOTE — H&P PST ADULT - NSICDXPASTSURGICALHX_GEN_ALL_CORE_FT
PAST SURGICAL HISTORY:  Excision Breast cyst (multiple) b/l    Fracture Calcaneus-Closed     History of craniotomy 1995    Status post gamma knife treatment

## 2023-10-09 NOTE — H&P PST ADULT - NSICDXPASTMEDICALHX_GEN_ALL_CORE_FT
PAST MEDICAL HISTORY:  Breast cyst     Cataract     CTS (carpal tunnel syndrome) BILATERAL    Degeneration macular     Fall from ladder 11/2012    Fracture Calcaneus-Closed     HTN (hypertension)     Meningioma     Rectocele     Uterine fibroid

## 2023-10-09 NOTE — H&P PST ADULT - NSICDXFAMILYHX_GEN_ALL_CORE_FT
FAMILY HISTORY:  Father  Still living? Unknown  Family history of emphysema, Age at diagnosis: Age Unknown  Family history of renal failure, Age at diagnosis: Age Unknown    Mother  Still living? Unknown  Family history of blood disorder, Age at diagnosis: Age Unknown    Grandparent  Still living? Unknown  Family history- stomach cancer, Age at diagnosis: Age Unknown

## 2023-10-09 NOTE — H&P PST ADULT - HISTORY OF PRESENT ILLNESS
67 y/o female with rectocele, PMH of brain meningioma S/P craniotomy, HTN. Pt reports rectal prolapse, she denies rectal pain or bleeding. Pt is here for PST for planned posterior repair, cystoscopy.  67 y/o female with rectocele, PMH of HTN, brain meningioma S/P craniotomy and gamma knife. Pt reports rectal prolapse, she denies rectal pain or bleeding. Pt is here for PST for planned posterior repair, cystoscopy.

## 2023-10-09 NOTE — H&P PST ADULT - ASSESSMENT
Plan  1. Stop all NSAIDS, herbal supplements and vitamins for 7 days.  2. NPO  as per ASU instructions.  3. Take the following medications ( ) with small sips of water on the morning of your procedure/surgery.  4. Labs, EKG as per surgeon. COVID test on. Pt instructed.  5. PMD visit for optimization prior to surgery as per surgeon           67 y/o female with rectocele, PMH of HTN, brain meningioma S/P craniotomy and gamma knife. Pt is scheduled for posterior repair, cystoscopy.   Plan  1. Stop all NSAIDS, herbal supplements and vitamins for 7 days.  2. NPO as per ASU instructions.  3. Hold Mounjaro 1 week before surgery.  4. Labs, EKG as per surgeon.   5. PMD visit for optimization prior to surgery as per surgeon

## 2023-10-10 ENCOUNTER — APPOINTMENT (OUTPATIENT)
Dept: FAMILY MEDICINE | Facility: CLINIC | Age: 68
End: 2023-10-10
Payer: MEDICARE

## 2023-10-10 ENCOUNTER — OUTPATIENT (OUTPATIENT)
Dept: OUTPATIENT SERVICES | Facility: HOSPITAL | Age: 68
LOS: 1 days | End: 2023-10-10
Payer: MEDICARE

## 2023-10-10 ENCOUNTER — APPOINTMENT (OUTPATIENT)
Dept: MRI IMAGING | Facility: CLINIC | Age: 68
End: 2023-10-10
Payer: MEDICARE

## 2023-10-10 VITALS
DIASTOLIC BLOOD PRESSURE: 78 MMHG | HEART RATE: 72 BPM | SYSTOLIC BLOOD PRESSURE: 122 MMHG | WEIGHT: 144 LBS | OXYGEN SATURATION: 98 % | BODY MASS INDEX: 25.52 KG/M2 | HEIGHT: 63 IN

## 2023-10-10 DIAGNOSIS — N94.10 UNSPECIFIED DYSPAREUNIA: ICD-10-CM

## 2023-10-10 DIAGNOSIS — D21.9 BENIGN NEOPLASM OF CONNECTIVE AND OTHER SOFT TISSUE, UNSPECIFIED: ICD-10-CM

## 2023-10-10 DIAGNOSIS — N81.6 RECTOCELE: ICD-10-CM

## 2023-10-10 DIAGNOSIS — Z92.3 PERSONAL HISTORY OF IRRADIATION: Chronic | ICD-10-CM

## 2023-10-10 DIAGNOSIS — Z01.818 ENCOUNTER FOR OTHER PREPROCEDURAL EXAMINATION: ICD-10-CM

## 2023-10-10 PROBLEM — H26.9 UNSPECIFIED CATARACT: Chronic | Status: ACTIVE | Noted: 2023-10-09

## 2023-10-10 PROBLEM — H35.30 UNSPECIFIED MACULAR DEGENERATION: Chronic | Status: ACTIVE | Noted: 2023-10-09

## 2023-10-10 PROBLEM — I10 ESSENTIAL (PRIMARY) HYPERTENSION: Chronic | Status: ACTIVE | Noted: 2023-10-09

## 2023-10-10 PROBLEM — D25.9 LEIOMYOMA OF UTERUS, UNSPECIFIED: Chronic | Status: ACTIVE | Noted: 2023-10-09

## 2023-10-10 LAB
CULTURE RESULTS: SIGNIFICANT CHANGE UP
SPECIMEN SOURCE: SIGNIFICANT CHANGE UP

## 2023-10-10 PROCEDURE — 72197 MRI PELVIS W/O & W/DYE: CPT

## 2023-10-10 PROCEDURE — 72197 MRI PELVIS W/O & W/DYE: CPT | Mod: 26,MH

## 2023-10-10 PROCEDURE — 99214 OFFICE O/P EST MOD 30 MIN: CPT

## 2023-10-11 ENCOUNTER — NON-APPOINTMENT (OUTPATIENT)
Age: 68
End: 2023-10-11

## 2023-10-12 ENCOUNTER — NON-APPOINTMENT (OUTPATIENT)
Age: 68
End: 2023-10-12

## 2023-10-12 LAB
ALBUMIN SERPL ELPH-MCNC: 4.5 G/DL
ALP BLD-CCNC: 102 U/L
ALT SERPL-CCNC: 17 U/L
ANION GAP SERPL CALC-SCNC: 19 MMOL/L
AST SERPL-CCNC: 18 U/L
BILIRUB SERPL-MCNC: 0.5 MG/DL
BUN SERPL-MCNC: 16 MG/DL
CALCIUM SERPL-MCNC: 9.9 MG/DL
CHLORIDE SERPL-SCNC: 104 MMOL/L
CO2 SERPL-SCNC: 20 MMOL/L
CREAT SERPL-MCNC: 0.7 MG/DL
EGFR: 95 ML/MIN/1.73M2
GLUCOSE SERPL-MCNC: 88 MG/DL
POTASSIUM SERPL-SCNC: 4.3 MMOL/L
PROT SERPL-MCNC: 6.9 G/DL
SODIUM SERPL-SCNC: 143 MMOL/L

## 2023-10-16 ENCOUNTER — TRANSCRIPTION ENCOUNTER (OUTPATIENT)
Age: 68
End: 2023-10-16

## 2023-10-16 ENCOUNTER — APPOINTMENT (OUTPATIENT)
Dept: UROGYNECOLOGY | Facility: HOSPITAL | Age: 68
End: 2023-10-16

## 2023-10-16 ENCOUNTER — RESULT REVIEW (OUTPATIENT)
Age: 68
End: 2023-10-16

## 2023-10-16 ENCOUNTER — OUTPATIENT (OUTPATIENT)
Dept: INPATIENT UNIT | Facility: HOSPITAL | Age: 68
LOS: 1 days | Discharge: HOME | End: 2023-10-16
Payer: MEDICARE

## 2023-10-16 VITALS
TEMPERATURE: 98 F | DIASTOLIC BLOOD PRESSURE: 83 MMHG | RESPIRATION RATE: 16 BRPM | SYSTOLIC BLOOD PRESSURE: 125 MMHG | HEART RATE: 62 BPM | HEIGHT: 63 IN | WEIGHT: 143.96 LBS | OXYGEN SATURATION: 100 %

## 2023-10-16 VITALS
SYSTOLIC BLOOD PRESSURE: 128 MMHG | DIASTOLIC BLOOD PRESSURE: 75 MMHG | RESPIRATION RATE: 14 BRPM | HEART RATE: 65 BPM | OXYGEN SATURATION: 99 % | TEMPERATURE: 98 F

## 2023-10-16 DIAGNOSIS — N81.6 RECTOCELE: ICD-10-CM

## 2023-10-16 DIAGNOSIS — Z92.3 PERSONAL HISTORY OF IRRADIATION: Chronic | ICD-10-CM

## 2023-10-16 PROCEDURE — 88302 TISSUE EXAM BY PATHOLOGIST: CPT

## 2023-10-16 PROCEDURE — 88302 TISSUE EXAM BY PATHOLOGIST: CPT | Mod: 26

## 2023-10-16 PROCEDURE — 57250 REPAIR RECTUM & VAGINA: CPT | Mod: AS

## 2023-10-16 PROCEDURE — 57250 REPAIR RECTUM & VAGINA: CPT

## 2023-10-16 RX ORDER — NADOLOL 80 MG/1
2 TABLET ORAL
Refills: 0 | DISCHARGE

## 2023-10-16 RX ORDER — TIRZEPATIDE 15 MG/.5ML
7.5 INJECTION, SOLUTION SUBCUTANEOUS
Refills: 0 | DISCHARGE

## 2023-10-16 RX ORDER — SODIUM CHLORIDE 9 MG/ML
1000 INJECTION, SOLUTION INTRAVENOUS
Refills: 0 | Status: DISCONTINUED | OUTPATIENT
Start: 2023-10-16 | End: 2023-10-16

## 2023-10-16 RX ORDER — HYDROMORPHONE HYDROCHLORIDE 2 MG/ML
0.5 INJECTION INTRAMUSCULAR; INTRAVENOUS; SUBCUTANEOUS
Refills: 0 | Status: DISCONTINUED | OUTPATIENT
Start: 2023-10-16 | End: 2023-10-16

## 2023-10-16 RX ORDER — ONDANSETRON 8 MG/1
4 TABLET, FILM COATED ORAL ONCE
Refills: 0 | Status: DISCONTINUED | OUTPATIENT
Start: 2023-10-16 | End: 2023-10-16

## 2023-10-16 RX ORDER — FENTANYL CITRATE 50 UG/ML
25 INJECTION INTRAVENOUS
Refills: 0 | Status: DISCONTINUED | OUTPATIENT
Start: 2023-10-16 | End: 2023-10-16

## 2023-10-16 NOTE — ASU PATIENT PROFILE, ADULT - FALL HARM RISK - UNIVERSAL INTERVENTIONS
Bed in lowest position, wheels locked, appropriate side rails in place/Call bell, personal items and telephone in reach/Instruct patient to call for assistance before getting out of bed or chair/Non-slip footwear when patient is out of bed/[place] to call system/Physically safe environment - no spills, clutter or unnecessary equipment/Purposeful Proactive Rounding/Room/bathroom lighting operational, light cord in reach

## 2023-10-16 NOTE — ASU DISCHARGE PLAN (ADULT/PEDIATRIC) - NS MD DC FALL RISK RISK
For information on Fall & Injury Prevention, visit: [email or web address] OR  [email or web address] OR  https://www.cdc.gov/steadi/patient.html

## 2023-10-16 NOTE — BRIEF OPERATIVE NOTE - OPERATION/FINDINGS
No anterior or apical prolapse. Posterior rectocele noted and repaired. Rectal exam at conclusion of procedure showing intact rectal mucosa.

## 2023-10-16 NOTE — ASU DISCHARGE PLAN (ADULT/PEDIATRIC) - CARE PROVIDER_API CALL
Eugenia Anderson  Obstetrics and Gynecology  [address] [place] [address], [address]  [place] [address]  Phone: [phone]  Fax: [phone]  Follow Up Time:

## 2023-10-17 ENCOUNTER — NON-APPOINTMENT (OUTPATIENT)
Age: 68
End: 2023-10-17

## 2023-10-20 DIAGNOSIS — E78.00 PURE HYPERCHOLESTEROLEMIA, UNSPECIFIED: ICD-10-CM

## 2023-10-20 DIAGNOSIS — I10 ESSENTIAL (PRIMARY) HYPERTENSION: ICD-10-CM

## 2023-10-20 DIAGNOSIS — N81.6 RECTOCELE: ICD-10-CM

## 2023-10-20 DIAGNOSIS — M85.80 OTHER SPECIFIED DISORDERS OF BONE DENSITY AND STRUCTURE, UNSPECIFIED SITE: ICD-10-CM

## 2023-10-20 DIAGNOSIS — E55.9 VITAMIN D DEFICIENCY, UNSPECIFIED: ICD-10-CM

## 2023-10-20 DIAGNOSIS — M48.061 SPINAL STENOSIS, LUMBAR REGION WITHOUT NEUROGENIC CLAUDICATION: ICD-10-CM

## 2023-10-20 DIAGNOSIS — F32.A DEPRESSION, UNSPECIFIED: ICD-10-CM

## 2023-10-23 LAB
SURGICAL PATHOLOGY STUDY: SIGNIFICANT CHANGE UP
SURGICAL PATHOLOGY STUDY: SIGNIFICANT CHANGE UP

## 2023-10-30 ENCOUNTER — APPOINTMENT (OUTPATIENT)
Dept: UROGYNECOLOGY | Facility: CLINIC | Age: 68
End: 2023-10-30
Payer: MEDICARE

## 2023-10-30 VITALS
HEART RATE: 63 BPM | HEIGHT: 63 IN | BODY MASS INDEX: 25.87 KG/M2 | WEIGHT: 146 LBS | OXYGEN SATURATION: 97 % | DIASTOLIC BLOOD PRESSURE: 89 MMHG | SYSTOLIC BLOOD PRESSURE: 134 MMHG | TEMPERATURE: 97.8 F

## 2023-10-30 PROCEDURE — 51798 US URINE CAPACITY MEASURE: CPT

## 2023-10-30 PROCEDURE — 99024 POSTOP FOLLOW-UP VISIT: CPT

## 2023-11-08 ENCOUNTER — OFFICE (OUTPATIENT)
Dept: URBAN - METROPOLITAN AREA CLINIC 111 | Facility: CLINIC | Age: 68
Setting detail: OPHTHALMOLOGY
End: 2023-11-08
Payer: MEDICARE

## 2023-11-08 DIAGNOSIS — C71.1: ICD-10-CM

## 2023-11-08 DIAGNOSIS — H25.13: ICD-10-CM

## 2023-11-08 PROCEDURE — 92133 CPTRZD OPH DX IMG PST SGM ON: CPT | Performed by: OPHTHALMOLOGY

## 2023-11-08 PROCEDURE — 99215 OFFICE O/P EST HI 40 MIN: CPT | Performed by: OPHTHALMOLOGY

## 2023-11-08 PROCEDURE — 92083 EXTENDED VISUAL FIELD XM: CPT | Performed by: OPHTHALMOLOGY

## 2023-11-08 ASSESSMENT — REFRACTION_MANIFEST
OS_AXIS: 090
OS_CYLINDER: -1.25
OD_AXIS: 075
OS_SPHERE: PLANO
OU_VA: 20/40
OD_CYLINDER: -0.75
OD_SPHERE: -0.25

## 2023-11-08 ASSESSMENT — REFRACTION_AUTOREFRACTION
OD_SPHERE: -0.25
OD_AXIS: 076
OS_CYLINDER: -1.25
OD_CYLINDER: -0.75
OS_SPHERE: 0.00
OS_AXIS: 089

## 2023-11-08 ASSESSMENT — REFRACTION_CURRENTRX
OS_AXIS: 096
OS_SPHERE: -0.50
OD_AXIS: 069
OD_CYLINDER: -1.00
OS_VPRISM_DIRECTION: SV
OS_CYLINDER: -1.00
OS_OVR_VA: 20/
OD_VPRISM_DIRECTION: SV
OD_SPHERE: -1.00
OD_OVR_VA: 20/

## 2023-11-08 ASSESSMENT — CONFRONTATIONAL VISUAL FIELD TEST (CVF)
OD_FINDINGS: FULL
OS_FINDINGS: FULL

## 2023-11-08 ASSESSMENT — LID POSITION - PTOSIS
OD_PTOSIS: ABSENT
OS_PTOSIS: ABSENT

## 2023-11-08 ASSESSMENT — SPHEQUIV_DERIVED
OS_SPHEQUIV: -0.625
OD_SPHEQUIV: -0.625
OD_SPHEQUIV: -0.625

## 2023-11-08 ASSESSMENT — TEAR BREAK UP TIME (TBUT)
OD_TBUT: 1+
OS_TBUT: 1+

## 2023-11-08 ASSESSMENT — LID POSITION - COMMENTS
OD_COMMENTS: INCISION INTACT
OS_COMMENTS: INCISION INTACT

## 2023-11-27 ENCOUNTER — APPOINTMENT (OUTPATIENT)
Dept: UROGYNECOLOGY | Facility: CLINIC | Age: 68
End: 2023-11-27
Payer: MEDICARE

## 2023-11-27 VITALS
WEIGHT: 144 LBS | BODY MASS INDEX: 25.52 KG/M2 | DIASTOLIC BLOOD PRESSURE: 87 MMHG | SYSTOLIC BLOOD PRESSURE: 144 MMHG | HEIGHT: 63 IN

## 2023-11-27 DIAGNOSIS — Z98.890 OTHER SPECIFIED POSTPROCEDURAL STATES: ICD-10-CM

## 2023-11-27 PROCEDURE — 99024 POSTOP FOLLOW-UP VISIT: CPT

## 2023-11-27 PROCEDURE — 51798 US URINE CAPACITY MEASURE: CPT

## 2023-12-01 ENCOUNTER — APPOINTMENT (OUTPATIENT)
Dept: FAMILY MEDICINE | Facility: CLINIC | Age: 68
End: 2023-12-01
Payer: MEDICARE

## 2023-12-01 ENCOUNTER — OFFICE (OUTPATIENT)
Dept: URBAN - METROPOLITAN AREA CLINIC 88 | Facility: CLINIC | Age: 68
Setting detail: OPHTHALMOLOGY
End: 2023-12-01
Payer: MEDICARE

## 2023-12-01 DIAGNOSIS — H43.393: ICD-10-CM

## 2023-12-01 DIAGNOSIS — H35.3131: ICD-10-CM

## 2023-12-01 PROCEDURE — 90662 IIV NO PRSV INCREASED AG IM: CPT

## 2023-12-01 PROCEDURE — 92134 CPTRZ OPH DX IMG PST SGM RTA: CPT | Performed by: OPHTHALMOLOGY

## 2023-12-01 PROCEDURE — G0008: CPT

## 2023-12-01 PROCEDURE — 92012 INTRM OPH EXAM EST PATIENT: CPT | Performed by: OPHTHALMOLOGY

## 2023-12-01 ASSESSMENT — LID POSITION - COMMENTS
OS_COMMENTS: INCISION INTACT
OD_COMMENTS: INCISION INTACT

## 2023-12-01 ASSESSMENT — REFRACTION_AUTOREFRACTION
OD_AXIS: 076
OS_SPHERE: 0.00
OS_CYLINDER: -1.25
OD_SPHERE: -0.25
OD_CYLINDER: -0.75
OS_AXIS: 089

## 2023-12-01 ASSESSMENT — LID POSITION - PTOSIS
OS_PTOSIS: ABSENT
OD_PTOSIS: ABSENT

## 2023-12-01 ASSESSMENT — TEAR BREAK UP TIME (TBUT)
OS_TBUT: 1+
OD_TBUT: 1+

## 2023-12-01 ASSESSMENT — SPHEQUIV_DERIVED
OS_SPHEQUIV: -0.625
OD_SPHEQUIV: -0.625

## 2023-12-15 ENCOUNTER — OFFICE (OUTPATIENT)
Dept: URBAN - METROPOLITAN AREA CLINIC 12 | Facility: CLINIC | Age: 68
Setting detail: OPHTHALMOLOGY
End: 2023-12-15
Payer: MEDICARE

## 2023-12-15 DIAGNOSIS — C71.1: ICD-10-CM

## 2023-12-15 DIAGNOSIS — H35.3131: ICD-10-CM

## 2023-12-15 DIAGNOSIS — H16.223: ICD-10-CM

## 2023-12-15 DIAGNOSIS — H43.393: ICD-10-CM

## 2023-12-15 DIAGNOSIS — H25.13: ICD-10-CM

## 2023-12-15 PROBLEM — H47.322 DRUSEN OF OPTIC DISC; LEFT EYE: Status: ACTIVE | Noted: 2023-12-01

## 2023-12-15 PROCEDURE — 99214 OFFICE O/P EST MOD 30 MIN: CPT | Performed by: OPHTHALMOLOGY

## 2023-12-15 ASSESSMENT — REFRACTION_AUTOREFRACTION
OD_SPHERE: -0.25
OD_CYLINDER: -0.75
OS_AXIS: 096
OS_CYLINDER: -1.50
OS_SPHERE: -0.25
OD_AXIS: 087

## 2023-12-15 ASSESSMENT — REFRACTION_MANIFEST
OD_AXIS: 087
OS_SPHERE: -0.25
OD_VA1: 20/25-1
OD_SPHERE: -0.25
OD_CYLINDER: -0.75
OS_VA1: 20/50-1
OS_AXIS: 096
OS_CYLINDER: -1.50

## 2023-12-15 ASSESSMENT — LID POSITION - PTOSIS
OS_PTOSIS: ABSENT
OD_PTOSIS: ABSENT

## 2023-12-15 ASSESSMENT — TEAR BREAK UP TIME (TBUT)
OD_TBUT: 1+
OS_TBUT: 1+

## 2023-12-15 ASSESSMENT — SPHEQUIV_DERIVED
OD_SPHEQUIV: -0.625
OS_SPHEQUIV: -1
OD_SPHEQUIV: -0.625
OS_SPHEQUIV: -1

## 2023-12-15 ASSESSMENT — LID POSITION - COMMENTS
OD_COMMENTS: INCISION INTACT
OS_COMMENTS: INCISION INTACT

## 2023-12-15 ASSESSMENT — CONFRONTATIONAL VISUAL FIELD TEST (CVF)
OS_FINDINGS: FULL
OD_FINDINGS: FULL

## 2024-02-01 ENCOUNTER — APPOINTMENT (OUTPATIENT)
Dept: UROGYNECOLOGY | Facility: CLINIC | Age: 69
End: 2024-02-01
Payer: MEDICARE

## 2024-02-01 VITALS
BODY MASS INDEX: 25.52 KG/M2 | SYSTOLIC BLOOD PRESSURE: 132 MMHG | HEART RATE: 73 BPM | DIASTOLIC BLOOD PRESSURE: 88 MMHG | WEIGHT: 144 LBS | HEIGHT: 63 IN

## 2024-02-01 LAB
BILIRUB UR QL STRIP: NEGATIVE
CLARITY UR: CLEAR
COLLECTION METHOD: NORMAL
GLUCOSE UR-MCNC: NEGATIVE
HCG UR QL: 0.2 EU/DL
HGB UR QL STRIP.AUTO: NEGATIVE
KETONES UR-MCNC: NEGATIVE
LEUKOCYTE ESTERASE UR QL STRIP: NEGATIVE
NITRITE UR QL STRIP: NEGATIVE
PH UR STRIP: 5.5
PROT UR STRIP-MCNC: NEGATIVE
SP GR UR STRIP: 1.02

## 2024-02-01 PROCEDURE — 99213 OFFICE O/P EST LOW 20 MIN: CPT

## 2024-02-01 PROCEDURE — 81003 URINALYSIS AUTO W/O SCOPE: CPT | Mod: QW

## 2024-02-01 PROCEDURE — 51798 US URINE CAPACITY MEASURE: CPT

## 2024-02-01 RX ORDER — ESTRADIOL 0.1 MG/G
0.1 CREAM VAGINAL
Qty: 1 | Refills: 2 | Status: ACTIVE | COMMUNITY
Start: 2024-02-01 | End: 1900-01-01

## 2024-02-01 NOTE — HISTORY OF PRESENT ILLNESS
[FreeTextEntry1] : NELIA is a 68 year female who presents s/p posterior repair on 10/16/23 for rectocele. Last seen for scheduled postop visit 11/27/2023. Denies incomplete emptying, dysuria or BERNARD.  She reports constipation, introital pain during intercourse, no bulge symptoms

## 2024-02-01 NOTE — ADDENDUM
[FreeTextEntry1] : This note was written by Kamilla Ozuna, acting as the  for Dr. Anderson. This note accurately reflects the work and decisions made by Dr. Anderson.

## 2024-02-01 NOTE — DISCUSSION/SUMMARY
[FreeTextEntry1] : NELIA is a 68 year female who presents s/p posterior repair on 10/16/23 for rectocele. discussed management of constipation, risks/benefits of vag estrogen discussed, rx given, instructions reviewed.  f/u 2 months

## 2024-02-01 NOTE — PHYSICAL EXAM
[Cough] : no cough [Tenderness] : ~T no ~M abdominal tenderness observed [Distended] : not distended [Vulvar Atrophy] : vulvar atrophy [Labia Majora] : were normal [Labia Minora] : were normal [FreeTextEntry4] : Good support, healing well and no masses or tenderness.

## 2024-03-12 ENCOUNTER — OFFICE (OUTPATIENT)
Dept: URBAN - METROPOLITAN AREA CLINIC 12 | Facility: CLINIC | Age: 69
Setting detail: OPHTHALMOLOGY
End: 2024-03-12
Payer: MEDICARE

## 2024-03-12 DIAGNOSIS — H25.13: ICD-10-CM

## 2024-03-12 DIAGNOSIS — H25.12: ICD-10-CM

## 2024-03-12 PROCEDURE — 92136 OPHTHALMIC BIOMETRY: CPT | Mod: TC | Performed by: OPHTHALMOLOGY

## 2024-03-12 PROCEDURE — 92136 OPHTHALMIC BIOMETRY: CPT | Mod: 26,LT | Performed by: OPHTHALMOLOGY

## 2024-03-12 PROCEDURE — 99213 OFFICE O/P EST LOW 20 MIN: CPT | Performed by: OPHTHALMOLOGY

## 2024-03-12 ASSESSMENT — REFRACTION_MANIFEST
OS_SPHERE: -0.25
OS_CYLINDER: -1.50
OS_AXIS: 096
OD_CYLINDER: -0.75
OD_AXIS: 087
OD_SPHERE: -0.25
OS_VA1: 20/50-1
OD_VA1: 20/25-1

## 2024-03-12 ASSESSMENT — LID POSITION - PTOSIS
OS_PTOSIS: ABSENT
OD_PTOSIS: ABSENT

## 2024-03-12 ASSESSMENT — SPHEQUIV_DERIVED
OD_SPHEQUIV: -0.625
OS_SPHEQUIV: -1

## 2024-03-12 ASSESSMENT — LID POSITION - COMMENTS
OD_COMMENTS: INCISION INTACT
OS_COMMENTS: INCISION INTACT

## 2024-03-14 ENCOUNTER — APPOINTMENT (OUTPATIENT)
Dept: FAMILY MEDICINE | Facility: CLINIC | Age: 69
End: 2024-03-14
Payer: MEDICARE

## 2024-03-14 VITALS
BODY MASS INDEX: 25.34 KG/M2 | WEIGHT: 143 LBS | HEART RATE: 75 BPM | SYSTOLIC BLOOD PRESSURE: 112 MMHG | DIASTOLIC BLOOD PRESSURE: 72 MMHG | HEIGHT: 63 IN | OXYGEN SATURATION: 98 %

## 2024-03-14 DIAGNOSIS — Z01.818 ENCOUNTER FOR OTHER PREPROCEDURAL EXAMINATION: ICD-10-CM

## 2024-03-14 DIAGNOSIS — Z23 ENCOUNTER FOR IMMUNIZATION: ICD-10-CM

## 2024-03-14 DIAGNOSIS — H26.9 UNSPECIFIED CATARACT: ICD-10-CM

## 2024-03-14 PROCEDURE — 99214 OFFICE O/P EST MOD 30 MIN: CPT

## 2024-03-14 NOTE — HISTORY OF PRESENT ILLNESS
[No Pertinent Cardiac History] : no history of aortic stenosis, atrial fibrillation, coronary artery disease, recent myocardial infarction, or implantable device/pacemaker [No Pertinent Pulmonary History] : no history of asthma, COPD, sleep apnea, or smoking [No Adverse Anesthesia Reaction] : no adverse anesthesia reaction in self or family member [Chronic Anticoagulation] : no chronic anticoagulation [Chronic Kidney Disease] : no chronic kidney disease [Diabetes] : no diabetes [(Patient denies any chest pain, claudication, dyspnea on exertion, orthopnea, palpitations or syncope)] : Patient denies any chest pain, claudication, dyspnea on exertion, orthopnea, palpitations or syncope [Good (7-10 METs)] : Good (7-10 METs) [FreeTextEntry1] :  Left Eye Cataract extraction with lens implant  [FreeTextEntry2] : 03/25/2024(left) &04/08/2024(right) [FreeTextEntry3] : Dr. Prajapati [FreeTextEntry4] : 68 yo female presents for pre-op exam Cataract extraction with lens implant on 03/25/2024 (left eye) &04/08/2024 (right eye) with Dr. Prajapati. Reports feeling well. Denies fever, chills, cp, palpitations, sob, nvcd.

## 2024-03-14 NOTE — ASSESSMENT
[High Risk Surgery - Intraperitoneal, Intrathoracic or Supringuinal Vascular Procedures] : High Risk Surgery - Intraperitoneal, Intrathoracic or Supringuinal Vascular Procedures - No (0) [Ischemic Heart Disease] : Ischemic Heart Disease - No (0) [Prior Cerebrovascular Accident or TIA] : Prior Cerebrovascular Accident or TIA - No (0) [Congestive Heart Failure] : Congestive Heart Failure - No (0) [Insulin-dependent Diabetic (1 Point)] : Insulin-dependent Diabetic - No (0) [Creatinine >= 2mg/dL (1 Point)] : Creatinine >= 2mg/dL - No (0) [0] : 0 , RCRI Class: I, Risk of Post-Op Cardiac Complications: 3.9%, 95% CI for Risk Estimate: 2.8% - 5.4% [Patient Optimized for Surgery] : Patient optimized for surgery [FreeTextEntry4] : 70 yo female presents for pre-op exam Cataract extraction with lens implant on 03/25/2024 (left eye) &04/08/2024 (right eye) with Dr. Prajapati medically optimized for procedure.

## 2024-03-14 NOTE — PLAN
[FreeTextEntry1] : Pre-op: Cataracts: f/u Dr. Prajapati HLD: Recommend low fat diet, wt loss, exercise, nutritional counseling provided HTN: recommend low salt diet, wt loss, exercise, DASH diet, c/w current regimen, bp reviewed RTC 4 wks

## 2024-03-21 ENCOUNTER — APPOINTMENT (OUTPATIENT)
Dept: UROGYNECOLOGY | Facility: CLINIC | Age: 69
End: 2024-03-21
Payer: MEDICARE

## 2024-03-21 VITALS
HEIGHT: 63 IN | HEART RATE: 66 BPM | BODY MASS INDEX: 25.34 KG/M2 | WEIGHT: 143 LBS | SYSTOLIC BLOOD PRESSURE: 117 MMHG | DIASTOLIC BLOOD PRESSURE: 77 MMHG | OXYGEN SATURATION: 98 %

## 2024-03-21 LAB
BILIRUB UR QL STRIP: NORMAL
CLARITY UR: CLEAR
COLLECTION METHOD: NORMAL
GLUCOSE UR-MCNC: NORMAL
HCG UR QL: 0.2 EU/DL
HGB UR QL STRIP.AUTO: NORMAL
KETONES UR-MCNC: NORMAL
LEUKOCYTE ESTERASE UR QL STRIP: NORMAL
NITRITE UR QL STRIP: NORMAL
PH UR STRIP: 5.5
PROT UR STRIP-MCNC: NORMAL
SP GR UR STRIP: 1.02

## 2024-03-21 PROCEDURE — 99213 OFFICE O/P EST LOW 20 MIN: CPT

## 2024-03-21 PROCEDURE — 51798 US URINE CAPACITY MEASURE: CPT

## 2024-03-21 PROCEDURE — 81003 URINALYSIS AUTO W/O SCOPE: CPT | Mod: QW

## 2024-03-21 NOTE — PHYSICAL EXAM
[No Acute Distress] : in no acute distress [Chaperone Present] : A chaperone was present in the examining room during all aspects of the physical examination [Well Nourished] : ~L well nourished [Well developed] : well developed [Oriented x3] : oriented to person, place, and time [No Edema] : ~T edema was not present [Warm and Dry] : was warm and dry to touch [Vulvar Atrophy] : vulvar atrophy [Labia Majora] : were normal [Labia Minora] : were normal [Normal] : was normal [Normal Appearance] : general appearance was normal [Atrophy] : atrophy [Cough] : no cough [Tenderness] : ~T no ~M abdominal tenderness observed [Distended] : not distended [FreeTextEntry4] : Good support, healing well and no masses or tenderness.

## 2024-03-21 NOTE — DISCUSSION/SUMMARY
[FreeTextEntry1] : NELIA is a 69 year female who presents s/p posterior repair on 10/16/23 for rectocele. Overall feeling well. Happy with results.   [] Continue estrace twice weekly [] f/u as needed

## 2024-03-21 NOTE — HISTORY OF PRESENT ILLNESS
[Vaginal Wall Prolapse] : no [Rectal Prolapse] : no [Unable To Restrain Bowel Movement] : no [Feelings Of Urinary Urgency] : no [Urinary Frequency] : no [Urinary Tract Infection] : no [Constipation Obstructed Defecation] : mild [] : months ago [FreeTextEntry1] : NELIA is a 69 year female who presents s/p posterior repair on 10/16/23 for rectocele. Prescribed estrace 02/01/2024. Discussed constipation management last visit. Denies bulge. Denies dysuria or BERNARD. Denies pain.

## 2024-03-25 ENCOUNTER — ASC (OUTPATIENT)
Dept: URBAN - METROPOLITAN AREA SURGERY 8 | Facility: SURGERY | Age: 69
Setting detail: OPHTHALMOLOGY
End: 2024-03-25
Payer: MEDICARE

## 2024-03-25 DIAGNOSIS — H25.12: ICD-10-CM

## 2024-03-25 PROCEDURE — 66984 XCAPSL CTRC RMVL W/O ECP: CPT | Mod: LT | Performed by: OPHTHALMOLOGY

## 2024-03-26 ENCOUNTER — RX ONLY (RX ONLY)
Age: 69
End: 2024-03-26

## 2024-03-26 ENCOUNTER — OFFICE (OUTPATIENT)
Dept: URBAN - METROPOLITAN AREA CLINIC 12 | Facility: CLINIC | Age: 69
Setting detail: OPHTHALMOLOGY
End: 2024-03-26
Payer: MEDICARE

## 2024-03-26 DIAGNOSIS — H25.13: ICD-10-CM

## 2024-03-26 PROCEDURE — 99024 POSTOP FOLLOW-UP VISIT: CPT | Performed by: OPHTHALMOLOGY

## 2024-03-26 ASSESSMENT — LID POSITION - COMMENTS
OS_COMMENTS: INCISION INTACT
OD_COMMENTS: INCISION INTACT

## 2024-03-26 ASSESSMENT — REFRACTION_MANIFEST
OD_VA1: 20/25-1
OS_AXIS: 096
OS_SPHERE: -0.25
OD_AXIS: 087
OD_SPHERE: -0.25
OS_CYLINDER: -1.50
OS_VA1: 20/50-1
OD_CYLINDER: -0.75

## 2024-03-26 ASSESSMENT — SPHEQUIV_DERIVED
OD_SPHEQUIV: -0.625
OS_SPHEQUIV: -1

## 2024-03-26 ASSESSMENT — LID POSITION - PTOSIS
OD_PTOSIS: ABSENT
OS_PTOSIS: ABSENT

## 2024-04-01 ENCOUNTER — NON-APPOINTMENT (OUTPATIENT)
Age: 69
End: 2024-04-01

## 2024-04-01 DIAGNOSIS — D25.9 LEIOMYOMA OF UTERUS, UNSPECIFIED: ICD-10-CM

## 2024-04-02 ENCOUNTER — OFFICE (OUTPATIENT)
Dept: URBAN - METROPOLITAN AREA CLINIC 12 | Facility: CLINIC | Age: 69
Setting detail: OPHTHALMOLOGY
End: 2024-04-02
Payer: MEDICARE

## 2024-04-02 DIAGNOSIS — H25.11: ICD-10-CM

## 2024-04-02 PROBLEM — Z96.1 PSEUDOPHAKIA ; LEFT EYE: Status: ACTIVE | Noted: 2024-03-26

## 2024-04-02 PROCEDURE — 92136 OPHTHALMIC BIOMETRY: CPT | Mod: 26,RT | Performed by: OPHTHALMOLOGY

## 2024-04-02 ASSESSMENT — LID POSITION - PTOSIS
OS_PTOSIS: ABSENT
OD_PTOSIS: ABSENT

## 2024-04-02 ASSESSMENT — LID POSITION - COMMENTS
OS_COMMENTS: INCISION INTACT
OD_COMMENTS: INCISION INTACT

## 2024-04-08 ENCOUNTER — ASC (OUTPATIENT)
Dept: URBAN - METROPOLITAN AREA SURGERY 8 | Facility: SURGERY | Age: 69
Setting detail: OPHTHALMOLOGY
End: 2024-04-08
Payer: MEDICARE

## 2024-04-08 DIAGNOSIS — H25.11: ICD-10-CM

## 2024-04-08 PROCEDURE — 66984 XCAPSL CTRC RMVL W/O ECP: CPT | Mod: 79,RT | Performed by: OPHTHALMOLOGY

## 2024-04-09 ENCOUNTER — RX ONLY (RX ONLY)
Age: 69
End: 2024-04-09

## 2024-04-09 ENCOUNTER — OFFICE (OUTPATIENT)
Dept: URBAN - METROPOLITAN AREA CLINIC 12 | Facility: CLINIC | Age: 69
Setting detail: OPHTHALMOLOGY
End: 2024-04-09
Payer: MEDICARE

## 2024-04-09 DIAGNOSIS — Z96.1: ICD-10-CM

## 2024-04-09 PROCEDURE — 99024 POSTOP FOLLOW-UP VISIT: CPT | Performed by: OPTOMETRIST

## 2024-04-09 ASSESSMENT — LID POSITION - COMMENTS
OS_COMMENTS: INCISION INTACT
OD_COMMENTS: INCISION INTACT

## 2024-04-09 ASSESSMENT — LID POSITION - PTOSIS
OS_PTOSIS: ABSENT
OD_PTOSIS: ABSENT

## 2024-04-10 ENCOUNTER — APPOINTMENT (OUTPATIENT)
Dept: MRI IMAGING | Facility: CLINIC | Age: 69
End: 2024-04-10
Payer: MEDICARE

## 2024-04-10 ENCOUNTER — OUTPATIENT (OUTPATIENT)
Dept: OUTPATIENT SERVICES | Facility: HOSPITAL | Age: 69
LOS: 1 days | End: 2024-04-10
Payer: MEDICARE

## 2024-04-10 DIAGNOSIS — Z92.3 PERSONAL HISTORY OF IRRADIATION: Chronic | ICD-10-CM

## 2024-04-10 DIAGNOSIS — D25.9 LEIOMYOMA OF UTERUS, UNSPECIFIED: ICD-10-CM

## 2024-04-10 PROCEDURE — 72197 MRI PELVIS W/O & W/DYE: CPT

## 2024-04-10 PROCEDURE — 72197 MRI PELVIS W/O & W/DYE: CPT | Mod: 26,MH

## 2024-04-10 PROCEDURE — A9585: CPT

## 2024-04-15 ENCOUNTER — APPOINTMENT (OUTPATIENT)
Dept: FAMILY MEDICINE | Facility: CLINIC | Age: 69
End: 2024-04-15
Payer: MEDICARE

## 2024-04-15 VITALS
OXYGEN SATURATION: 98 % | DIASTOLIC BLOOD PRESSURE: 74 MMHG | HEIGHT: 63 IN | WEIGHT: 146 LBS | HEART RATE: 70 BPM | BODY MASS INDEX: 25.87 KG/M2 | SYSTOLIC BLOOD PRESSURE: 120 MMHG

## 2024-04-15 DIAGNOSIS — Z01.84 ENCOUNTER FOR ANTIBODY RESPONSE EXAMINATION: ICD-10-CM

## 2024-04-15 DIAGNOSIS — Z12.31 ENCOUNTER FOR SCREENING MAMMOGRAM FOR MALIGNANT NEOPLASM OF BREAST: ICD-10-CM

## 2024-04-15 DIAGNOSIS — I10 ESSENTIAL (PRIMARY) HYPERTENSION: ICD-10-CM

## 2024-04-15 DIAGNOSIS — M85.80 OTHER SPECIFIED DISORDERS OF BONE DENSITY AND STRUCTURE, UNSPECIFIED SITE: ICD-10-CM

## 2024-04-15 DIAGNOSIS — E78.00 PURE HYPERCHOLESTEROLEMIA, UNSPECIFIED: ICD-10-CM

## 2024-04-15 DIAGNOSIS — R73.01 IMPAIRED FASTING GLUCOSE: ICD-10-CM

## 2024-04-15 PROCEDURE — G0439: CPT

## 2024-04-15 RX ORDER — VALACYCLOVIR 1 G/1
1 TABLET, FILM COATED ORAL
Qty: 60 | Refills: 1 | Status: COMPLETED | COMMUNITY
Start: 2023-03-01 | End: 2024-04-15

## 2024-04-15 NOTE — ADDENDUM
[FreeTextEntry1] : This note was written by Grant Brown, acting as the  for Dr. Metz. This note accurately reflects the work and decisions made by Dr. Metz.

## 2024-04-15 NOTE — HISTORY OF PRESENT ILLNESS
[de-identified] : NELIA HESS is a 69-year-old female being seen for an annual wellness visit. [facility] wellness exam. Pt is currently taking Estradiol cream, Mounjaro 7.5mg, and Nadolol 20mg. Pt had her last mammo in 2020, is due for a follow-up. Pt is followed by the Uro Gynecologist, Dr. Anderson; had gone for a follow-up MRI of the uterus to examine any changes in a fibroid, is still waiting for the results. She is followed by the cardiologist, Dr. Herrmann, had bloodwork done which showed elevated lipid levels. She follows up with a dermatologist regularly for skin cancer screenings.

## 2024-04-15 NOTE — HEALTH RISK ASSESSMENT
[Patient reported mammogram was normal] : Patient reported mammogram was normal [Patient reported bone density results were normal] : Patient reported bone density results were normal [Patient reported colonoscopy was normal] : Patient reported colonoscopy was normal [Good] : ~his/her~  mood as  good [Yes] : Yes [Monthly or less (1 pt)] : Monthly or less (1 point) [1 or 2 (0 pts)] : 1 or 2 (0 points) [Never (0 pts)] : Never (0 points) [No] : In the past 12 months have you used drugs other than those required for medical reasons? No [No falls in past year] : Patient reported no falls in the past year [0] : 2) Feeling down, depressed, or hopeless: Not at all (0) [PHQ-2 Negative - No further assessment needed] : PHQ-2 Negative - No further assessment needed [None] : None [With Family] : lives with family [Retired] : retired [] :  [Feels Safe at Home] : Feels safe at home [Fully functional (bathing, dressing, toileting, transferring, walking, feeding)] : Fully functional (bathing, dressing, toileting, transferring, walking, feeding) [Fully functional (using the telephone, shopping, preparing meals, housekeeping, doing laundry, using] : Fully functional and needs no help or supervision to perform IADLs (using the telephone, shopping, preparing meals, housekeeping, doing laundry, using transportation, managing medications and managing finances) [Smoke Detector] : smoke detector [Never] : Never [Audit-CScore] : 1 [de-identified] : She is not engaging in exercise, is self conscious  [[id number]] : 0 [Change in mental status noted] : No change in mental status noted [Reports changes in hearing] : Reports no changes in hearing [Reports changes in vision] : Reports no changes in vision [Reports normal functional visual acuity (ie: able to read med bottle)] : Reports poor functional visual acuity.  [Reports changes in dental health] : Reports no changes in dental health [MammogramDate] : 03/20 [BoneDensityDate] : 02/21 [ColonoscopyDate] : 11/19

## 2024-04-15 NOTE — PLAN
[FreeTextEntry1] : -Bloodwork done.  -Discussed the shingles vaccine, informed the patient of the intense pain the rash causes as well as the side effects of the vaccine including slight fatigue. Recommended the patient come in immediately for treatment in the event of a shingles like rash. Will add a Varicella antibody test in her bloodwork to see if she has been exposed. -Recommend the patient strength train for 20 minutes, twice a week, as per ACSM guidelines.  -Recommend the patient walk / jog daily as a form of aerobic activity. Informed her to engage in 150 minutes of aerobic activity weekly.  -Recommend the patient stretch daily for 7-10 minutes.  -Informed her that she may need to go on cholesterol medication if she cannot get her cholesterol levels down.  -Will give the patient a script for a mammogram to search for signs of dense breast / breast cancer.  -Recommended the patient go for a bone density exam to examine any signs of osteoporosis / osteopenia, or any signs of degradation in bone density. Will give the patient a script for a bone density test to examine any changes in her bone mass and assess fracture risk.  -Discussed the Prevnar 20 vaccine, informed the patient of the potential risks and side effects of the vaccine as well as risks associated with pneumonia.  -Will follow-up with the patient in 1 year or as needed.

## 2024-04-15 NOTE — REASON FOR VISIT
[Annual Wellness Visit] : an annual wellness visit [FreeTextEntry1] : [facility] wellness exam

## 2024-04-16 ENCOUNTER — OFFICE (OUTPATIENT)
Dept: URBAN - METROPOLITAN AREA CLINIC 12 | Facility: CLINIC | Age: 69
Setting detail: OPHTHALMOLOGY
End: 2024-04-16
Payer: MEDICARE

## 2024-04-16 DIAGNOSIS — Z96.1: ICD-10-CM

## 2024-04-16 LAB
25(OH)D3 SERPL-MCNC: 40.6 NG/ML
ESTIMATED AVERAGE GLUCOSE: 103 MG/DL
HBA1C MFR BLD HPLC: 5.2 %
VZV AB TITR SER: POSITIVE
VZV IGG SER IF-ACNC: 1805 INDEX

## 2024-04-16 PROCEDURE — 99024 POSTOP FOLLOW-UP VISIT: CPT | Performed by: OPHTHALMOLOGY

## 2024-04-16 ASSESSMENT — LID POSITION - PTOSIS
OS_PTOSIS: ABSENT
OD_PTOSIS: ABSENT

## 2024-04-16 ASSESSMENT — LID POSITION - COMMENTS
OD_COMMENTS: INCISION INTACT
OS_COMMENTS: INCISION INTACT

## 2024-04-29 ENCOUNTER — APPOINTMENT (OUTPATIENT)
Dept: UROGYNECOLOGY | Facility: CLINIC | Age: 69
End: 2024-04-29

## 2024-05-17 ENCOUNTER — OFFICE (OUTPATIENT)
Dept: URBAN - METROPOLITAN AREA CLINIC 12 | Facility: CLINIC | Age: 69
Setting detail: OPHTHALMOLOGY
End: 2024-05-17
Payer: MEDICARE

## 2024-05-17 DIAGNOSIS — Z96.1: ICD-10-CM

## 2024-05-17 PROCEDURE — 99024 POSTOP FOLLOW-UP VISIT: CPT | Performed by: OPTOMETRIST

## 2024-05-17 ASSESSMENT — LID POSITION - COMMENTS
OD_COMMENTS: INCISION INTACT
OS_COMMENTS: INCISION INTACT

## 2024-05-17 ASSESSMENT — LID POSITION - PTOSIS
OD_PTOSIS: ABSENT
OS_PTOSIS: ABSENT

## 2024-05-17 ASSESSMENT — CONFRONTATIONAL VISUAL FIELD TEST (CVF)
OS_FINDINGS: FULL
OD_FINDINGS: FULL

## 2024-06-07 ENCOUNTER — OFFICE (OUTPATIENT)
Dept: URBAN - METROPOLITAN AREA CLINIC 88 | Facility: CLINIC | Age: 69
Setting detail: OPHTHALMOLOGY
End: 2024-06-07
Payer: MEDICARE

## 2024-06-07 DIAGNOSIS — H35.3131: ICD-10-CM

## 2024-06-07 DIAGNOSIS — H43.393: ICD-10-CM

## 2024-06-07 PROCEDURE — 92134 CPTRZ OPH DX IMG PST SGM RTA: CPT | Performed by: OPHTHALMOLOGY

## 2024-06-07 PROCEDURE — 92014 COMPRE OPH EXAM EST PT 1/>: CPT | Performed by: OPHTHALMOLOGY

## 2024-06-07 PROCEDURE — 92202 OPSCPY EXTND ON/MAC DRAW: CPT | Performed by: OPHTHALMOLOGY

## 2024-06-07 ASSESSMENT — LID POSITION - COMMENTS
OS_COMMENTS: INCISION INTACT
OD_COMMENTS: INCISION INTACT

## 2024-06-07 ASSESSMENT — LID POSITION - PTOSIS
OS_PTOSIS: ABSENT
OD_PTOSIS: ABSENT

## 2024-06-07 ASSESSMENT — CONFRONTATIONAL VISUAL FIELD TEST (CVF)
OD_FINDINGS: FULL
OS_FINDINGS: FULL

## 2024-06-11 ENCOUNTER — NON-APPOINTMENT (OUTPATIENT)
Age: 69
End: 2024-06-11

## 2024-06-18 ENCOUNTER — NON-APPOINTMENT (OUTPATIENT)
Age: 69
End: 2024-06-18

## 2024-08-13 ENCOUNTER — APPOINTMENT (OUTPATIENT)
Dept: FAMILY MEDICINE | Facility: CLINIC | Age: 69
End: 2024-08-13

## 2024-08-13 VITALS
HEART RATE: 76 BPM | OXYGEN SATURATION: 98 % | BODY MASS INDEX: 25.87 KG/M2 | HEIGHT: 63 IN | DIASTOLIC BLOOD PRESSURE: 76 MMHG | SYSTOLIC BLOOD PRESSURE: 116 MMHG | WEIGHT: 146 LBS

## 2024-08-13 PROCEDURE — 99212 OFFICE O/P EST SF 10 MIN: CPT

## 2024-08-14 ENCOUNTER — RX ONLY (RX ONLY)
Age: 69
End: 2024-08-14

## 2024-08-14 ENCOUNTER — OFFICE (OUTPATIENT)
Dept: URBAN - METROPOLITAN AREA CLINIC 12 | Facility: CLINIC | Age: 69
Setting detail: OPHTHALMOLOGY
End: 2024-08-14
Payer: MEDICARE

## 2024-08-14 DIAGNOSIS — H16.223: ICD-10-CM

## 2024-08-14 DIAGNOSIS — H35.3131: ICD-10-CM

## 2024-08-14 DIAGNOSIS — H43.393: ICD-10-CM

## 2024-08-14 DIAGNOSIS — C71.1: ICD-10-CM

## 2024-08-14 DIAGNOSIS — H47.322: ICD-10-CM

## 2024-08-14 PROCEDURE — 92014 COMPRE OPH EXAM EST PT 1/>: CPT | Performed by: OPHTHALMOLOGY

## 2024-08-14 PROCEDURE — 92250 FUNDUS PHOTOGRAPHY W/I&R: CPT | Performed by: OPHTHALMOLOGY

## 2024-08-14 ASSESSMENT — LID POSITION - PTOSIS
OS_PTOSIS: ABSENT
OD_PTOSIS: ABSENT

## 2024-08-14 ASSESSMENT — LID POSITION - COMMENTS
OD_COMMENTS: INCISION INTACT
OS_COMMENTS: INCISION INTACT

## 2024-08-14 ASSESSMENT — CONFRONTATIONAL VISUAL FIELD TEST (CVF)
OS_FINDINGS: FULL
OD_FINDINGS: FULL

## 2024-10-26 ENCOUNTER — NON-APPOINTMENT (OUTPATIENT)
Age: 69
End: 2024-10-26

## 2024-10-28 ENCOUNTER — APPOINTMENT (OUTPATIENT)
Dept: UROGYNECOLOGY | Facility: CLINIC | Age: 69
End: 2024-10-28

## 2024-10-28 VITALS
HEIGHT: 63 IN | BODY MASS INDEX: 25.87 KG/M2 | WEIGHT: 146 LBS | SYSTOLIC BLOOD PRESSURE: 116 MMHG | DIASTOLIC BLOOD PRESSURE: 80 MMHG

## 2024-10-28 LAB
BILIRUB UR QL STRIP: NEGATIVE
CLARITY UR: CLEAR
COLLECTION METHOD: NORMAL
GLUCOSE UR-MCNC: NEGATIVE
HCG UR QL: 0.2 EU/DL
HGB UR QL STRIP.AUTO: NEGATIVE
KETONES UR-MCNC: NORMAL
LEUKOCYTE ESTERASE UR QL STRIP: NEGATIVE
NITRITE UR QL STRIP: NEGATIVE
PH UR STRIP: 5.5
PROT UR STRIP-MCNC: NEGATIVE
SP GR UR STRIP: >=1.03

## 2024-10-28 PROCEDURE — 51701 INSERT BLADDER CATHETER: CPT | Mod: 59

## 2024-10-28 PROCEDURE — 99459 PELVIC EXAMINATION: CPT

## 2024-10-28 PROCEDURE — 99214 OFFICE O/P EST MOD 30 MIN: CPT | Mod: 25

## 2024-10-28 PROCEDURE — 81003 URINALYSIS AUTO W/O SCOPE: CPT | Mod: QW

## 2024-10-28 RX ORDER — ESTRADIOL 10 UG/1
10 TABLET, FILM COATED VAGINAL
Qty: 57 | Refills: 0 | Status: ACTIVE | COMMUNITY
Start: 2024-10-28 | End: 1900-01-01

## 2024-10-29 LAB
APPEARANCE: CLEAR
BACTERIA: NEGATIVE /HPF
BILIRUBIN URINE: NEGATIVE
BLOOD URINE: NEGATIVE
CAST: 0 /LPF
COLOR: NORMAL
EPITHELIAL CELLS: 3 /HPF
GLUCOSE QUALITATIVE U: NEGATIVE MG/DL
KETONES URINE: NEGATIVE MG/DL
LEUKOCYTE ESTERASE URINE: NEGATIVE
MICROSCOPIC-UA: NORMAL
NITRITE URINE: NEGATIVE
PH URINE: 5.5
PROTEIN URINE: NEGATIVE MG/DL
RED BLOOD CELLS URINE: 0 /HPF
SPECIFIC GRAVITY URINE: 1.03
UROBILINOGEN URINE: 0.2 MG/DL
WHITE BLOOD CELLS URINE: 2 /HPF

## 2024-10-30 LAB — BACTERIA UR CULT: NORMAL

## 2024-11-07 ENCOUNTER — OFFICE (OUTPATIENT)
Dept: URBAN - METROPOLITAN AREA CLINIC 100 | Facility: CLINIC | Age: 69
Setting detail: OPHTHALMOLOGY
End: 2024-11-07
Payer: MEDICARE

## 2024-11-07 DIAGNOSIS — H00.14: ICD-10-CM

## 2024-11-07 PROCEDURE — 99213 OFFICE O/P EST LOW 20 MIN: CPT | Performed by: OPHTHALMOLOGY

## 2024-11-07 ASSESSMENT — REFRACTION_AUTOREFRACTION
OS_CYLINDER: -1.25
OS_SPHERE: +0.75
OD_AXIS: 099
OD_CYLINDER: -0.75
OD_SPHERE: +0.75
OS_AXIS: 111

## 2024-11-07 ASSESSMENT — LID POSITION - COMMENTS
OD_COMMENTS: INCISION INTACT
OS_COMMENTS: INCISION INTACT

## 2024-11-07 ASSESSMENT — KERATOMETRY
OD_AXISANGLE_DEGREES: 010
OS_K2POWER_DIOPTERS: 47.25
OD_K2POWER_DIOPTERS: 47.00
OD_K1POWER_DIOPTERS: 46.75
OS_AXISANGLE_DEGREES: 070
METHOD_AUTO_MANUAL: AUTO
OS_K1POWER_DIOPTERS: 46.50

## 2024-11-07 ASSESSMENT — LID POSITION - PTOSIS
OS_PTOSIS: ABSENT
OD_PTOSIS: ABSENT

## 2024-11-07 ASSESSMENT — TEAR BREAK UP TIME (TBUT)
OD_TBUT: 1+
OS_TBUT: 1+

## 2024-11-07 ASSESSMENT — TONOMETRY
OS_IOP_MMHG: 14
OD_IOP_MMHG: 11

## 2024-11-07 ASSESSMENT — VISUAL ACUITY
OD_BCVA: 20/30-2
OS_BCVA: 20/25-2

## 2024-11-19 ENCOUNTER — OUTPATIENT (OUTPATIENT)
Dept: OUTPATIENT SERVICES | Facility: HOSPITAL | Age: 69
LOS: 1 days | End: 2024-11-19

## 2024-11-19 ENCOUNTER — APPOINTMENT (OUTPATIENT)
Dept: MRI IMAGING | Facility: CLINIC | Age: 69
End: 2024-11-19

## 2024-11-19 DIAGNOSIS — R19.8 OTHER SPECIFIED SYMPTOMS AND SIGNS INVOLVING THE DIGESTIVE SYSTEM AND ABDOMEN: ICD-10-CM

## 2024-11-19 DIAGNOSIS — Z92.3 PERSONAL HISTORY OF IRRADIATION: Chronic | ICD-10-CM

## 2024-11-19 DIAGNOSIS — D21.9 BENIGN NEOPLASM OF CONNECTIVE AND OTHER SOFT TISSUE, UNSPECIFIED: ICD-10-CM

## 2025-01-02 ENCOUNTER — APPOINTMENT (OUTPATIENT)
Dept: UROGYNECOLOGY | Facility: CLINIC | Age: 70
End: 2025-01-02

## 2025-01-22 ENCOUNTER — OFFICE (OUTPATIENT)
Dept: URBAN - METROPOLITAN AREA CLINIC 88 | Facility: CLINIC | Age: 70
Setting detail: OPHTHALMOLOGY
End: 2025-01-22
Payer: MEDICARE

## 2025-01-22 DIAGNOSIS — H43.393: ICD-10-CM

## 2025-01-22 DIAGNOSIS — H35.3131: ICD-10-CM

## 2025-01-22 PROCEDURE — 92134 CPTRZ OPH DX IMG PST SGM RTA: CPT | Performed by: OPHTHALMOLOGY

## 2025-01-22 PROCEDURE — 92014 COMPRE OPH EXAM EST PT 1/>: CPT | Performed by: OPHTHALMOLOGY

## 2025-01-22 ASSESSMENT — TEAR BREAK UP TIME (TBUT)
OD_TBUT: 1+
OS_TBUT: 1+

## 2025-01-22 ASSESSMENT — KERATOMETRY
OD_K1POWER_DIOPTERS: 46.75
OS_K1POWER_DIOPTERS: 46.50
METHOD_AUTO_MANUAL: AUTO
OD_K2POWER_DIOPTERS: 47.00
OD_AXISANGLE_DEGREES: 010
OS_K2POWER_DIOPTERS: 47.25
OS_AXISANGLE_DEGREES: 070

## 2025-01-22 ASSESSMENT — REFRACTION_AUTOREFRACTION
OS_CYLINDER: -1.25
OS_AXIS: 111
OD_AXIS: 099
OD_CYLINDER: -0.75
OD_SPHERE: +0.75
OS_SPHERE: +0.75

## 2025-01-22 ASSESSMENT — CONFRONTATIONAL VISUAL FIELD TEST (CVF)
OS_FINDINGS: FULL
OD_FINDINGS: FULL

## 2025-01-22 ASSESSMENT — LID POSITION - PTOSIS
OS_PTOSIS: ABSENT
OD_PTOSIS: ABSENT

## 2025-01-22 ASSESSMENT — VISUAL ACUITY
OD_BCVA: 20/25
OS_BCVA: 20/25-2

## 2025-01-22 ASSESSMENT — LID POSITION - COMMENTS
OS_COMMENTS: INCISION INTACT
OD_COMMENTS: INCISION INTACT

## 2025-01-22 ASSESSMENT — TONOMETRY
OD_IOP_MMHG: 18
OS_IOP_MMHG: 12

## 2025-03-13 ENCOUNTER — OFFICE (OUTPATIENT)
Dept: URBAN - METROPOLITAN AREA CLINIC 12 | Facility: CLINIC | Age: 70
Setting detail: OPHTHALMOLOGY
End: 2025-03-13
Payer: MEDICARE

## 2025-03-13 DIAGNOSIS — H52.4: ICD-10-CM

## 2025-03-13 PROCEDURE — 92015 DETERMINE REFRACTIVE STATE: CPT | Performed by: OPTOMETRIST

## 2025-03-13 ASSESSMENT — KERATOMETRY
OD_K1POWER_DIOPTERS: 46.50
OS_AXISANGLE_DEGREES: 018
OS_K1POWER_DIOPTERS: 46.75
OD_K2POWER_DIOPTERS: 47.00
OS_K2POWER_DIOPTERS: 47.00
METHOD_AUTO_MANUAL: AUTO
OD_AXISANGLE_DEGREES: 018

## 2025-03-13 ASSESSMENT — REFRACTION_CURRENTRX
OS_OVR_VA: 20/
OD_OVR_VA: 20/
OS_ADD: +2.50
OD_ADD: +2.50

## 2025-03-13 ASSESSMENT — REFRACTION_MANIFEST
OD_VA1: 20/20
OD_AXIS: 090
OD_SPHERE: +0.50
OS_AXIS: 105
OS_ADD: +2.50
OD_CYLINDER: -0.75
OS_CYLINDER: -0.75
OS_VA1: 20/30+
OS_SPHERE: PLANO
OD_ADD: +2.50

## 2025-03-13 ASSESSMENT — REFRACTION_AUTOREFRACTION
OS_AXIS: 103
OD_SPHERE: +1.00
OD_AXIS: 099
OS_CYLINDER: -1.00
OS_SPHERE: +0.50
OD_CYLINDER: -0.75

## 2025-03-13 ASSESSMENT — TONOMETRY
OD_IOP_MMHG: 10
OS_IOP_MMHG: 10

## 2025-03-13 ASSESSMENT — VISUAL ACUITY
OD_BCVA: 20/30+2
OS_BCVA: 20/20-

## 2025-03-13 ASSESSMENT — TEAR BREAK UP TIME (TBUT)
OD_TBUT: 1+
OS_TBUT: 1+

## 2025-03-13 ASSESSMENT — LID POSITION - PTOSIS
OD_PTOSIS: ABSENT
OS_PTOSIS: ABSENT

## 2025-03-13 ASSESSMENT — LID POSITION - COMMENTS
OS_COMMENTS: INCISION INTACT
OD_COMMENTS: INCISION INTACT

## 2025-03-13 ASSESSMENT — CONFRONTATIONAL VISUAL FIELD TEST (CVF)
OS_FINDINGS: FULL
OD_FINDINGS: FULL

## 2025-03-25 ENCOUNTER — APPOINTMENT (OUTPATIENT)
Dept: OBGYN | Facility: CLINIC | Age: 70
End: 2025-03-25
Payer: MEDICARE

## 2025-03-25 DIAGNOSIS — N60.12 DIFFUSE CYSTIC MASTOPATHY OF RIGHT BREAST: ICD-10-CM

## 2025-03-25 DIAGNOSIS — Z12.39 ENCOUNTER FOR OTHER SCREENING FOR MALIGNANT NEOPLASM OF BREAST: ICD-10-CM

## 2025-03-25 DIAGNOSIS — Z80.3 FAMILY HISTORY OF MALIGNANT NEOPLASM OF BREAST: ICD-10-CM

## 2025-03-25 DIAGNOSIS — D49.6 NEOPLASM OF UNSPECIFIED BEHAVIOR OF BRAIN: ICD-10-CM

## 2025-03-25 DIAGNOSIS — N60.11 DIFFUSE CYSTIC MASTOPATHY OF RIGHT BREAST: ICD-10-CM

## 2025-03-25 DIAGNOSIS — Z12.4 ENCOUNTER FOR SCREENING FOR MALIGNANT NEOPLASM OF CERVIX: ICD-10-CM

## 2025-03-25 DIAGNOSIS — Z98.890 OTHER SPECIFIED POSTPROCEDURAL STATES: ICD-10-CM

## 2025-03-25 DIAGNOSIS — Z13.820 ENCOUNTER FOR SCREENING FOR OSTEOPOROSIS: ICD-10-CM

## 2025-03-25 DIAGNOSIS — Z01.419 ENCOUNTER FOR GYNECOLOGICAL EXAMINATION (GENERAL) (ROUTINE) W/OUT ABNORMAL FINDINGS: ICD-10-CM

## 2025-03-25 DIAGNOSIS — Z87.39 PERSONAL HISTORY OF OTHER DISEASES OF THE MUSCULOSKELETAL SYSTEM AND CONNECTIVE TISSUE: ICD-10-CM

## 2025-03-25 LAB
APPEARANCE: CLEAR
BILIRUBIN URINE: NEGATIVE
BLOOD URINE: NEGATIVE
COLOR: YELLOW
GLUCOSE QUALITATIVE U: NEGATIVE
KETONES URINE: NEGATIVE
LEUKOCYTE ESTERASE URINE: NEGATIVE
NITRITE URINE: NEGATIVE
PH URINE: 5.5
PROTEIN URINE: NEGATIVE
SPECIFIC GRAVITY URINE: 1.02
UROBILINOGEN URINE: 0.2 (ref 0.2–?)

## 2025-03-25 PROCEDURE — G0101: CPT

## 2025-03-27 LAB — CYTOLOGY CVX/VAG DOC THIN PREP: NORMAL

## 2025-05-08 ENCOUNTER — NON-APPOINTMENT (OUTPATIENT)
Age: 70
End: 2025-05-08

## 2025-05-20 ENCOUNTER — OUTPATIENT (OUTPATIENT)
Dept: OUTPATIENT SERVICES | Facility: HOSPITAL | Age: 70
LOS: 1 days | End: 2025-05-20
Payer: MEDICARE

## 2025-05-20 DIAGNOSIS — Z92.3 PERSONAL HISTORY OF IRRADIATION: Chronic | ICD-10-CM

## 2025-05-20 DIAGNOSIS — G47.33 OBSTRUCTIVE SLEEP APNEA (ADULT) (PEDIATRIC): ICD-10-CM

## 2025-05-20 PROCEDURE — 95800 SLP STDY UNATTENDED: CPT

## 2025-05-20 PROCEDURE — G0400: CPT | Mod: 26

## 2025-05-29 DIAGNOSIS — N89.8 OTHER SPECIFIED NONINFLAMMATORY DISORDERS OF VAGINA: ICD-10-CM

## 2025-05-29 RX ORDER — FLUCONAZOLE 150 MG/1
150 TABLET ORAL
Qty: 1 | Refills: 0 | Status: ACTIVE | COMMUNITY
Start: 2025-05-29 | End: 1900-01-01

## 2025-06-25 ENCOUNTER — APPOINTMENT (OUTPATIENT)
Dept: PULMONOLOGY | Facility: CLINIC | Age: 70
End: 2025-06-25
Payer: MEDICARE

## 2025-06-25 VITALS
OXYGEN SATURATION: 98 % | HEIGHT: 63 IN | SYSTOLIC BLOOD PRESSURE: 118 MMHG | DIASTOLIC BLOOD PRESSURE: 74 MMHG | WEIGHT: 150 LBS | RESPIRATION RATE: 16 BRPM | HEART RATE: 79 BPM | BODY MASS INDEX: 26.58 KG/M2

## 2025-06-25 PROBLEM — G47.19 EXCESSIVE DAYTIME SLEEPINESS: Status: ACTIVE | Noted: 2025-06-25

## 2025-06-25 PROBLEM — G47.33 OSA (OBSTRUCTIVE SLEEP APNEA): Status: ACTIVE | Noted: 2025-06-25

## 2025-06-25 PROBLEM — Z71.89 CPAP USE COUNSELING: Status: ACTIVE | Noted: 2025-06-25

## 2025-06-25 PROCEDURE — G2211 COMPLEX E/M VISIT ADD ON: CPT

## 2025-06-25 PROCEDURE — 99204 OFFICE O/P NEW MOD 45 MIN: CPT

## 2025-06-25 RX ORDER — TIRZEPATIDE 7.5 MG/.5ML
INJECTION, SOLUTION SUBCUTANEOUS
Refills: 0 | Status: ACTIVE | COMMUNITY

## 2025-08-12 ENCOUNTER — OFFICE (OUTPATIENT)
Dept: URBAN - METROPOLITAN AREA CLINIC 12 | Facility: CLINIC | Age: 70
Setting detail: OPHTHALMOLOGY
End: 2025-08-12
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
## 2025-08-12 PROCEDURE — NO SHOW FE NO SHOW FEE: Performed by: OPHTHALMOLOGY

## 2025-08-26 ENCOUNTER — NON-APPOINTMENT (OUTPATIENT)
Age: 70
End: 2025-08-26

## 2025-09-04 ENCOUNTER — APPOINTMENT (OUTPATIENT)
Facility: CLINIC | Age: 70
End: 2025-09-04